# Patient Record
Sex: FEMALE | Race: BLACK OR AFRICAN AMERICAN | Employment: UNEMPLOYED | URBAN - METROPOLITAN AREA
[De-identification: names, ages, dates, MRNs, and addresses within clinical notes are randomized per-mention and may not be internally consistent; named-entity substitution may affect disease eponyms.]

---

## 2017-01-25 ENCOUNTER — OFFICE VISIT (OUTPATIENT)
Dept: PRIMARY CARE CLINIC | Age: 82
End: 2017-01-25

## 2017-01-25 VITALS
HEART RATE: 88 BPM | BODY MASS INDEX: 23.24 KG/M2 | SYSTOLIC BLOOD PRESSURE: 140 MMHG | WEIGHT: 148.4 LBS | DIASTOLIC BLOOD PRESSURE: 70 MMHG

## 2017-01-25 DIAGNOSIS — Z00.00 HEALTH CARE MAINTENANCE: ICD-10-CM

## 2017-01-25 DIAGNOSIS — E78.2 MIXED HYPERLIPIDEMIA: ICD-10-CM

## 2017-01-25 DIAGNOSIS — R73.03 PREDIABETES: ICD-10-CM

## 2017-01-25 DIAGNOSIS — Z78.0 POSTMENOPAUSAL: ICD-10-CM

## 2017-01-25 DIAGNOSIS — I10 ESSENTIAL HYPERTENSION: Primary | ICD-10-CM

## 2017-01-25 PROCEDURE — 99214 OFFICE O/P EST MOD 30 MIN: CPT | Performed by: INTERNAL MEDICINE

## 2017-01-27 ASSESSMENT — ENCOUNTER SYMPTOMS
NAUSEA: 0
WHEEZING: 0
DIARRHEA: 0
BACK PAIN: 0
COUGH: 0
CHEST TIGHTNESS: 0
VOMITING: 0
SHORTNESS OF BREATH: 0
EYE REDNESS: 0
EYE ITCHING: 0
ABDOMINAL PAIN: 0
CONSTIPATION: 0
SORE THROAT: 0

## 2017-03-16 ENCOUNTER — HOSPITAL ENCOUNTER (OUTPATIENT)
Dept: GENERAL RADIOLOGY | Age: 82
Discharge: HOME | End: 2017-03-16
Attending: INTERNAL MEDICINE | Admitting: INTERNAL MEDICINE

## 2017-03-16 DIAGNOSIS — Z78.0 POSTMENOPAUSAL: ICD-10-CM

## 2017-03-16 DIAGNOSIS — Z78.0 ASYMPTOMATIC MENOPAUSAL STATE: ICD-10-CM

## 2017-04-24 ENCOUNTER — OFFICE VISIT (OUTPATIENT)
Dept: PRIMARY CARE CLINIC | Age: 82
End: 2017-04-24

## 2017-04-24 VITALS
HEIGHT: 67 IN | DIASTOLIC BLOOD PRESSURE: 70 MMHG | BODY MASS INDEX: 23.07 KG/M2 | SYSTOLIC BLOOD PRESSURE: 148 MMHG | WEIGHT: 147 LBS

## 2017-04-24 DIAGNOSIS — I10 ESSENTIAL HYPERTENSION: ICD-10-CM

## 2017-04-24 DIAGNOSIS — N18.3 CKD (CHRONIC KIDNEY DISEASE), STAGE 3 (MODERATE): ICD-10-CM

## 2017-04-24 DIAGNOSIS — R73.03 PREDIABETES: ICD-10-CM

## 2017-04-24 DIAGNOSIS — R73.9 HYPERGLYCEMIA: ICD-10-CM

## 2017-04-24 DIAGNOSIS — E78.2 MIXED HYPERLIPIDEMIA: ICD-10-CM

## 2017-04-24 DIAGNOSIS — I10 ESSENTIAL HYPERTENSION: Primary | ICD-10-CM

## 2017-04-24 LAB
A/G RATIO: 1.7 (ref 1.1–2.2)
ALBUMIN SERPL-MCNC: 4.7 G/DL (ref 3.4–5)
ALP BLD-CCNC: 68 U/L (ref 40–129)
ALT SERPL-CCNC: 6 U/L (ref 10–40)
ANION GAP SERPL CALCULATED.3IONS-SCNC: 16 MMOL/L (ref 3–16)
AST SERPL-CCNC: 16 U/L (ref 15–37)
BILIRUB SERPL-MCNC: 0.3 MG/DL (ref 0–1)
BUN BLDV-MCNC: 14 MG/DL (ref 7–20)
CALCIUM SERPL-MCNC: 10.6 MG/DL (ref 8.3–10.6)
CHLORIDE BLD-SCNC: 107 MMOL/L (ref 99–110)
CHOLESTEROL, TOTAL: 272 MG/DL (ref 0–199)
CO2: 25 MMOL/L (ref 21–32)
CREAT SERPL-MCNC: 1.3 MG/DL (ref 0.6–1.2)
CREATININE URINE: 135.3 MG/DL (ref 28–259)
GFR AFRICAN AMERICAN: 47
GFR NON-AFRICAN AMERICAN: 39
GLOBULIN: 2.8 G/DL
GLUCOSE BLD-MCNC: 88 MG/DL (ref 70–99)
HDLC SERPL-MCNC: 91 MG/DL (ref 40–60)
LDL CHOLESTEROL CALCULATED: 151 MG/DL
MICROALBUMIN UR-MCNC: <1.2 MG/DL
MICROALBUMIN/CREAT UR-RTO: NORMAL MG/G (ref 0–30)
POTASSIUM SERPL-SCNC: 4.6 MMOL/L (ref 3.5–5.1)
SODIUM BLD-SCNC: 148 MMOL/L (ref 136–145)
TOTAL PROTEIN: 7.5 G/DL (ref 6.4–8.2)
TRIGL SERPL-MCNC: 149 MG/DL (ref 0–150)
VLDLC SERPL CALC-MCNC: 30 MG/DL

## 2017-04-24 PROCEDURE — 99214 OFFICE O/P EST MOD 30 MIN: CPT | Performed by: INTERNAL MEDICINE

## 2017-04-24 ASSESSMENT — ENCOUNTER SYMPTOMS
ABDOMINAL PAIN: 0
CHEST TIGHTNESS: 0
COUGH: 0
SHORTNESS OF BREATH: 0
VOMITING: 0
EYE REDNESS: 0
CONSTIPATION: 0
WHEEZING: 0
DIARRHEA: 0
SORE THROAT: 0
EYE ITCHING: 0
BACK PAIN: 0
NAUSEA: 0

## 2017-04-25 LAB
ESTIMATED AVERAGE GLUCOSE: 114 MG/DL
HBA1C MFR BLD: 5.6 %

## 2017-04-25 RX ORDER — ATORVASTATIN CALCIUM 10 MG/1
10 TABLET, FILM COATED ORAL DAILY
Qty: 90 TABLET | Refills: 1 | Status: SHIPPED | OUTPATIENT
Start: 2017-04-25 | End: 2017-07-24 | Stop reason: SDUPTHER

## 2017-05-12 DIAGNOSIS — I10 ESSENTIAL HYPERTENSION: ICD-10-CM

## 2017-05-12 RX ORDER — LISINOPRIL 40 MG/1
40 TABLET ORAL DAILY
Qty: 90 TABLET | Refills: 1 | Status: SHIPPED | OUTPATIENT
Start: 2017-05-12 | End: 2018-03-06 | Stop reason: SDUPTHER

## 2017-07-24 ENCOUNTER — OFFICE VISIT (OUTPATIENT)
Dept: PRIMARY CARE CLINIC | Age: 82
End: 2017-07-24

## 2017-07-24 VITALS — BODY MASS INDEX: 22.99 KG/M2 | WEIGHT: 146.8 LBS | DIASTOLIC BLOOD PRESSURE: 76 MMHG | SYSTOLIC BLOOD PRESSURE: 172 MMHG

## 2017-07-24 DIAGNOSIS — N18.3 CKD (CHRONIC KIDNEY DISEASE), STAGE 3 (MODERATE): ICD-10-CM

## 2017-07-24 DIAGNOSIS — E78.2 MIXED HYPERLIPIDEMIA: ICD-10-CM

## 2017-07-24 DIAGNOSIS — I10 ESSENTIAL HYPERTENSION: ICD-10-CM

## 2017-07-24 PROCEDURE — 99214 OFFICE O/P EST MOD 30 MIN: CPT | Performed by: INTERNAL MEDICINE

## 2017-07-24 RX ORDER — AMLODIPINE BESYLATE 10 MG/1
TABLET ORAL
Qty: 90 TABLET | Refills: 1 | Status: SHIPPED | OUTPATIENT
Start: 2017-07-24 | End: 2018-03-06 | Stop reason: SDUPTHER

## 2017-07-24 RX ORDER — HYDROCHLOROTHIAZIDE 25 MG/1
25 TABLET ORAL DAILY
Qty: 90 TABLET | Refills: 0 | Status: SHIPPED | OUTPATIENT
Start: 2017-07-24 | End: 2018-03-06 | Stop reason: SDUPTHER

## 2017-07-24 RX ORDER — ATORVASTATIN CALCIUM 10 MG/1
10 TABLET, FILM COATED ORAL DAILY
Qty: 90 TABLET | Refills: 1 | Status: SHIPPED | OUTPATIENT
Start: 2017-07-24 | End: 2018-03-06 | Stop reason: SDUPTHER

## 2017-07-24 ASSESSMENT — ENCOUNTER SYMPTOMS
NAUSEA: 0
EYE REDNESS: 0
CONSTIPATION: 0
EYE ITCHING: 0
CHEST TIGHTNESS: 0
BACK PAIN: 0
VOMITING: 0
ABDOMINAL PAIN: 0
SORE THROAT: 0
WHEEZING: 0
COUGH: 0
SHORTNESS OF BREATH: 0
DIARRHEA: 0

## 2017-07-24 ASSESSMENT — PATIENT HEALTH QUESTIONNAIRE - PHQ9
1. LITTLE INTEREST OR PLEASURE IN DOING THINGS: 0
2. FEELING DOWN, DEPRESSED OR HOPELESS: 1
SUM OF ALL RESPONSES TO PHQ9 QUESTIONS 1 & 2: 1
SUM OF ALL RESPONSES TO PHQ QUESTIONS 1-9: 1

## 2017-08-07 ENCOUNTER — OFFICE VISIT (OUTPATIENT)
Dept: PRIMARY CARE CLINIC | Age: 82
End: 2017-08-07

## 2017-08-07 VITALS — SYSTOLIC BLOOD PRESSURE: 144 MMHG | WEIGHT: 145.6 LBS | BODY MASS INDEX: 22.8 KG/M2 | DIASTOLIC BLOOD PRESSURE: 76 MMHG

## 2017-08-07 DIAGNOSIS — I10 ESSENTIAL HYPERTENSION: ICD-10-CM

## 2017-08-07 PROCEDURE — 99213 OFFICE O/P EST LOW 20 MIN: CPT | Performed by: INTERNAL MEDICINE

## 2017-08-08 ASSESSMENT — ENCOUNTER SYMPTOMS
BACK PAIN: 0
NAUSEA: 0
COUGH: 0
EYE ITCHING: 0
SORE THROAT: 0
DIARRHEA: 0
VOMITING: 0
EYE REDNESS: 0
SHORTNESS OF BREATH: 0
ABDOMINAL PAIN: 0
CONSTIPATION: 0
WHEEZING: 0
CHEST TIGHTNESS: 0

## 2017-12-06 ENCOUNTER — OFFICE VISIT (OUTPATIENT)
Dept: PRIMARY CARE CLINIC | Age: 82
End: 2017-12-06

## 2017-12-06 VITALS
BODY MASS INDEX: 22.3 KG/M2 | DIASTOLIC BLOOD PRESSURE: 78 MMHG | SYSTOLIC BLOOD PRESSURE: 146 MMHG | HEART RATE: 72 BPM | WEIGHT: 142.4 LBS

## 2017-12-06 DIAGNOSIS — N18.30 STAGE 3 CHRONIC KIDNEY DISEASE (HCC): ICD-10-CM

## 2017-12-06 DIAGNOSIS — R73.03 PREDIABETES: ICD-10-CM

## 2017-12-06 DIAGNOSIS — E78.2 MIXED HYPERLIPIDEMIA: ICD-10-CM

## 2017-12-06 DIAGNOSIS — I10 ESSENTIAL HYPERTENSION: ICD-10-CM

## 2017-12-06 PROCEDURE — 90686 IIV4 VACC NO PRSV 0.5 ML IM: CPT | Performed by: INTERNAL MEDICINE

## 2017-12-06 PROCEDURE — 99214 OFFICE O/P EST MOD 30 MIN: CPT | Performed by: INTERNAL MEDICINE

## 2017-12-06 PROCEDURE — G0008 ADMIN INFLUENZA VIRUS VAC: HCPCS | Performed by: INTERNAL MEDICINE

## 2017-12-06 ASSESSMENT — ENCOUNTER SYMPTOMS
EYE REDNESS: 0
NAUSEA: 0
COUGH: 0
SORE THROAT: 0
VOMITING: 0
WHEEZING: 0
EYE ITCHING: 0
DIARRHEA: 0
CHEST TIGHTNESS: 0
SHORTNESS OF BREATH: 0
CONSTIPATION: 0
ABDOMINAL PAIN: 0
BACK PAIN: 0

## 2017-12-06 NOTE — PROGRESS NOTES
MG tablet TAKE ONE TABLET BY MOUTH DAILY 90 tablet 1    lisinopril (PRINIVIL;ZESTRIL) 40 MG tablet Take 1 tablet by mouth daily 90 tablet 1    vitamin D (CHOLECALCIFEROL) 1000 UNIT TABS tablet Take 1,000 Units by mouth daily      latanoprost (XALATAN) 0.005 % ophthalmic solution Place 1 drop into the left eye nightly      timolol (BETIMOL) 0.25 % ophthalmic solution Place 1 drop into the left eye 2 times daily      brimonidine (ALPHAGAN) 0.2 % ophthalmic solution Place 1 drop into the left eye 3 times daily      atropine 1 % ophthalmic ointment Place into the right eye 3 times daily 3 times daily.  dorzolamide-timolol (COSOPT) 22.3-6.8 MG/ML ophthalmic solution Place 1 drop into the right eye 2 times daily.  acetaminophen (TYLENOL) 325 MG tablet Take 650 mg by mouth every 6 hours as needed. Review of Systems   Constitutional: Negative for activity change, appetite change, chills, diaphoresis, fever and unexpected weight change. HENT: Negative for congestion, ear pain and sore throat. Eyes: Negative for redness and itching. Respiratory: Negative for cough, chest tightness, shortness of breath and wheezing. Cardiovascular: Negative for chest pain, palpitations and leg swelling. Gastrointestinal: Negative for abdominal pain, constipation, diarrhea, nausea and vomiting. Genitourinary: Negative for difficulty urinating, dysuria and hematuria. Musculoskeletal: Positive for arthralgias. Negative for back pain and joint swelling. Skin: Negative for rash. Neurological: Negative for weakness and headaches. Psychiatric/Behavioral: Negative for dysphoric mood. Objective:   Physical Exam   Constitutional: She appears well-developed and well-nourished. No distress. HENT:   Head: Normocephalic and atraumatic. Eyes: Conjunctivae are normal. Right eye exhibits no discharge. Left eye exhibits no discharge. No scleral icterus.    Cardiovascular: Normal rate, regular rhythm and normal heart sounds. Exam reveals no gallop and no friction rub. No murmur heard. Pulmonary/Chest: Effort normal and breath sounds normal. No respiratory distress. She has no wheezes. She has no rales. Abdominal: Soft. Bowel sounds are normal. There is no tenderness. There is no guarding. Neurological: She is alert. Coordination normal.   Skin: Skin is warm and dry. She is not diaphoretic. Psychiatric: She has a normal mood and affect. Judgment normal.   Vitals reviewed. Assessment:      Hypertension  Stable and goal for patient.    -Continue current regimen. Hyperlipidemia  Stable. No problem with med.    -Continue current regimen. Prediabetes  Not activated. Stressors with recent passing of sister.    -Coaching given. CKD (chronic kidney disease)  Stable. -Continue current regimen.           Plan:      Above

## 2017-12-06 NOTE — PROGRESS NOTES
Vaccine Information Sheet, \"Influenza - Inactivated\"  given to Claudell Jenny, or parent/legal guardian of  Claudell Jenny and verbalized understanding. Patient responses:    Have you ever had a reaction to a flu vaccine? No  Are you able to eat eggs without adverse effects? Yes  Do you have any current illness? No  Have you ever had Guillian Eagle Syndrome? No    Flu vaccine given per order. Please see immunization tab.

## 2018-03-06 ENCOUNTER — OFFICE VISIT (OUTPATIENT)
Dept: PRIMARY CARE CLINIC | Age: 83
End: 2018-03-06

## 2018-03-06 VITALS
BODY MASS INDEX: 22.4 KG/M2 | HEART RATE: 88 BPM | WEIGHT: 143 LBS | DIASTOLIC BLOOD PRESSURE: 78 MMHG | SYSTOLIC BLOOD PRESSURE: 168 MMHG

## 2018-03-06 DIAGNOSIS — I10 ESSENTIAL HYPERTENSION: ICD-10-CM

## 2018-03-06 LAB
A/G RATIO: 1.5 (ref 1.1–2.2)
ALBUMIN SERPL-MCNC: 4.6 G/DL (ref 3.4–5)
ALP BLD-CCNC: 66 U/L (ref 40–129)
ALT SERPL-CCNC: 6 U/L (ref 10–40)
ANION GAP SERPL CALCULATED.3IONS-SCNC: 14 MMOL/L (ref 3–16)
AST SERPL-CCNC: 18 U/L (ref 15–37)
BILIRUB SERPL-MCNC: 0.4 MG/DL (ref 0–1)
BUN BLDV-MCNC: 15 MG/DL (ref 7–20)
CALCIUM SERPL-MCNC: 10.6 MG/DL (ref 8.3–10.6)
CHLORIDE BLD-SCNC: 102 MMOL/L (ref 99–110)
CHOLESTEROL, TOTAL: 198 MG/DL (ref 0–199)
CO2: 27 MMOL/L (ref 21–32)
CREAT SERPL-MCNC: 1.2 MG/DL (ref 0.6–1.2)
GFR AFRICAN AMERICAN: 52
GFR NON-AFRICAN AMERICAN: 43
GLOBULIN: 3 G/DL
GLUCOSE BLD-MCNC: 95 MG/DL (ref 70–99)
HDLC SERPL-MCNC: 98 MG/DL (ref 40–60)
LDL CHOLESTEROL CALCULATED: 71 MG/DL
POTASSIUM SERPL-SCNC: 4.2 MMOL/L (ref 3.5–5.1)
SODIUM BLD-SCNC: 143 MMOL/L (ref 136–145)
TOTAL PROTEIN: 7.6 G/DL (ref 6.4–8.2)
TRIGL SERPL-MCNC: 147 MG/DL (ref 0–150)
VLDLC SERPL CALC-MCNC: 29 MG/DL

## 2018-03-06 PROCEDURE — 99214 OFFICE O/P EST MOD 30 MIN: CPT | Performed by: INTERNAL MEDICINE

## 2018-03-06 RX ORDER — ATORVASTATIN CALCIUM 10 MG/1
10 TABLET, FILM COATED ORAL DAILY
Qty: 90 TABLET | Refills: 1 | Status: SHIPPED | OUTPATIENT
Start: 2018-03-06 | End: 2018-06-25 | Stop reason: SDUPTHER

## 2018-03-06 RX ORDER — AMLODIPINE BESYLATE 10 MG/1
TABLET ORAL
Qty: 90 TABLET | Refills: 1 | Status: SHIPPED | OUTPATIENT
Start: 2018-03-06 | End: 2018-06-25 | Stop reason: SDUPTHER

## 2018-03-06 RX ORDER — LISINOPRIL 40 MG/1
40 TABLET ORAL DAILY
Qty: 90 TABLET | Refills: 1 | Status: SHIPPED | OUTPATIENT
Start: 2018-03-06 | End: 2018-06-25 | Stop reason: SDUPTHER

## 2018-03-06 RX ORDER — HYDROCHLOROTHIAZIDE 25 MG/1
25 TABLET ORAL DAILY
Qty: 90 TABLET | Refills: 0 | Status: SHIPPED | OUTPATIENT
Start: 2018-03-06 | End: 2018-06-25 | Stop reason: SDUPTHER
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
## 2018-03-20 ENCOUNTER — OFFICE VISIT (OUTPATIENT)
Dept: PRIMARY CARE CLINIC | Age: 83
End: 2018-03-20

## 2018-03-20 DIAGNOSIS — I10 ESSENTIAL HYPERTENSION: ICD-10-CM

## 2018-03-20 PROCEDURE — 99213 OFFICE O/P EST LOW 20 MIN: CPT | Performed by: INTERNAL MEDICINE

## 2018-03-21 VITALS
SYSTOLIC BLOOD PRESSURE: 136 MMHG | BODY MASS INDEX: 22.71 KG/M2 | DIASTOLIC BLOOD PRESSURE: 72 MMHG | HEART RATE: 80 BPM | WEIGHT: 145 LBS

## 2018-03-21 ASSESSMENT — ENCOUNTER SYMPTOMS
CONSTIPATION: 0
EYE REDNESS: 0
CHEST TIGHTNESS: 0
NAUSEA: 0
SHORTNESS OF BREATH: 0
ABDOMINAL PAIN: 0
WHEEZING: 0
SORE THROAT: 0
COUGH: 0
EYE ITCHING: 0
VOMITING: 0
BACK PAIN: 0
DIARRHEA: 0

## 2018-06-25 ENCOUNTER — OFFICE VISIT (OUTPATIENT)
Dept: PRIMARY CARE CLINIC | Age: 83
End: 2018-06-25

## 2018-06-25 VITALS
BODY MASS INDEX: 24.75 KG/M2 | DIASTOLIC BLOOD PRESSURE: 66 MMHG | WEIGHT: 145 LBS | HEIGHT: 64 IN | SYSTOLIC BLOOD PRESSURE: 134 MMHG

## 2018-06-25 DIAGNOSIS — R73.03 PREDIABETES: ICD-10-CM

## 2018-06-25 DIAGNOSIS — N18.30 STAGE 3 CHRONIC KIDNEY DISEASE (HCC): ICD-10-CM

## 2018-06-25 DIAGNOSIS — E78.2 MIXED HYPERLIPIDEMIA: ICD-10-CM

## 2018-06-25 DIAGNOSIS — I10 ESSENTIAL HYPERTENSION: ICD-10-CM

## 2018-06-25 PROCEDURE — 99214 OFFICE O/P EST MOD 30 MIN: CPT | Performed by: INTERNAL MEDICINE

## 2018-06-25 RX ORDER — LISINOPRIL 40 MG/1
40 TABLET ORAL DAILY
Qty: 90 TABLET | Refills: 1 | Status: SHIPPED | OUTPATIENT
Start: 2018-06-25 | End: 2020-11-25

## 2018-06-25 RX ORDER — AMLODIPINE BESYLATE 10 MG/1
TABLET ORAL
Qty: 90 TABLET | Refills: 1 | Status: SHIPPED | OUTPATIENT
Start: 2018-06-25 | End: 2020-11-25

## 2018-06-25 RX ORDER — HYDROCHLOROTHIAZIDE 25 MG/1
25 TABLET ORAL DAILY
Qty: 90 TABLET | Refills: 1 | Status: SHIPPED | OUTPATIENT
Start: 2018-06-25 | End: 2020-11-25

## 2018-06-25 RX ORDER — ATORVASTATIN CALCIUM 10 MG/1
10 TABLET, FILM COATED ORAL DAILY
Qty: 90 TABLET | Refills: 1 | Status: SHIPPED | OUTPATIENT
Start: 2018-06-25 | End: 2020-11-25

## 2018-06-25 ASSESSMENT — ENCOUNTER SYMPTOMS
COUGH: 0
CONSTIPATION: 0
EYE ITCHING: 0
EYE REDNESS: 0
BACK PAIN: 0
WHEEZING: 0
NAUSEA: 0
SORE THROAT: 0
VOMITING: 0
SHORTNESS OF BREATH: 0
CHEST TIGHTNESS: 0
ABDOMINAL PAIN: 0
DIARRHEA: 0

## 2018-09-25 ENCOUNTER — OFFICE VISIT (OUTPATIENT)
Dept: PRIMARY CARE CLINIC | Age: 83
End: 2018-09-25
Payer: MEDICARE

## 2018-09-25 VITALS — BODY MASS INDEX: 25.75 KG/M2 | DIASTOLIC BLOOD PRESSURE: 72 MMHG | SYSTOLIC BLOOD PRESSURE: 134 MMHG | WEIGHT: 150 LBS

## 2018-09-25 DIAGNOSIS — R73.03 PREDIABETES: ICD-10-CM

## 2018-09-25 DIAGNOSIS — I10 ESSENTIAL HYPERTENSION: ICD-10-CM

## 2018-09-25 DIAGNOSIS — N18.30 STAGE 3 CHRONIC KIDNEY DISEASE (HCC): ICD-10-CM

## 2018-09-25 DIAGNOSIS — E78.2 MIXED HYPERLIPIDEMIA: ICD-10-CM

## 2018-09-25 PROCEDURE — G0008 ADMIN INFLUENZA VIRUS VAC: HCPCS | Performed by: INTERNAL MEDICINE

## 2018-09-25 PROCEDURE — 90662 IIV NO PRSV INCREASED AG IM: CPT | Performed by: INTERNAL MEDICINE

## 2018-09-25 PROCEDURE — 90750 HZV VACC RECOMBINANT IM: CPT | Performed by: INTERNAL MEDICINE

## 2018-09-25 PROCEDURE — 99214 OFFICE O/P EST MOD 30 MIN: CPT | Performed by: INTERNAL MEDICINE

## 2018-09-25 PROCEDURE — 90471 IMMUNIZATION ADMIN: CPT | Performed by: INTERNAL MEDICINE

## 2018-09-25 ASSESSMENT — PATIENT HEALTH QUESTIONNAIRE - PHQ9
SUM OF ALL RESPONSES TO PHQ QUESTIONS 1-9: 0
1. LITTLE INTEREST OR PLEASURE IN DOING THINGS: 0
SUM OF ALL RESPONSES TO PHQ9 QUESTIONS 1 & 2: 0
SUM OF ALL RESPONSES TO PHQ QUESTIONS 1-9: 0
2. FEELING DOWN, DEPRESSED OR HOPELESS: 0

## 2018-09-26 NOTE — PROGRESS NOTES
Subjective:      Patient ID: Eddie Martines is a 80 y.o. female. HPI  Patient Active Problem List   Diagnosis    History of malignant melanoma of eye    Glaucoma    Health care maintenance    Hypertension  HYPERTENSION. Also here for f/u HTN. She indicates that she is feeling well and denies any symptoms referable to her elevated blood pressure. Specifically denies chest pain, palpitations, dyspnea, orthopnea, PND or peripheral edema. No anorexia, arthralgia, or leg cramps noted. Current medication regimen is as listed below. She denies any side effects of medication, and has been taking it regularly. Patient does not exercise regularly      CKD (chronic kidney disease)    Hyperlipidemia  Dyslipidemia: Patient presents for f/u of lipid disorder. Compliance with treatment thus far has been good. A repeat fasting lipid profile was not done. The patient does not have family history of premature CAD. The patient denies side effects from medication including nausea and myalgias. The patient exercises rarely. Patient is overweight.     Prediabetes       Vitals:    09/25/18 1258   BP: 134/72   Weight: 150 lb (68 kg)      Wt Readings from Last 2 Encounters:   09/25/18 150 lb (68 kg)   06/25/18 145 lb (65.8 kg)     BP Readings from Last 3 Encounters:   09/25/18 134/72   06/25/18 134/66   03/20/18 136/72        No Known Allergies  Outpatient Prescriptions Marked as Taking for the 9/25/18 encounter (Office Visit) with Shayy Luna MD   Medication Sig Dispense Refill    amLODIPine (NORVASC) 10 MG tablet TAKE ONE TABLET BY MOUTH DAILY 90 tablet 1    lisinopril (PRINIVIL;ZESTRIL) 40 MG tablet Take 1 tablet by mouth daily 90 tablet 1    hydrochlorothiazide (HYDRODIURIL) 25 MG tablet Take 1 tablet by mouth daily 90 tablet 1    atorvastatin (LIPITOR) 10 MG tablet Take 1 tablet by mouth daily 90 tablet 1    vitamin D (CHOLECALCIFEROL) 1000 UNIT TABS tablet Take 1,000 Units by mouth daily      latanoprost (XALATAN) 0.005 % ophthalmic solution Place 1 drop into the left eye nightly      timolol (BETIMOL) 0.25 % ophthalmic solution Place 1 drop into the left eye 2 times daily      brimonidine (ALPHAGAN) 0.2 % ophthalmic solution Place 1 drop into the left eye 3 times daily      atropine 1 % ophthalmic ointment Place into the right eye 3 times daily 3 times daily.  dorzolamide-timolol (COSOPT) 22.3-6.8 MG/ML ophthalmic solution Place 1 drop into the right eye 2 times daily.  acetaminophen (TYLENOL) 325 MG tablet Take 650 mg by mouth every 6 hours as needed. Review of Systems    Objective:   Physical Exam   Constitutional: She appears well-developed and well-nourished. No distress. HENT:   Head: Normocephalic and atraumatic. Eyes: Conjunctivae are normal. Right eye exhibits no discharge. Left eye exhibits no discharge. No scleral icterus. Cardiovascular: Normal rate, regular rhythm and normal heart sounds. Exam reveals no gallop and no friction rub. No murmur heard. Pulmonary/Chest: Effort normal and breath sounds normal. No respiratory distress. She has no wheezes. She has no rales. Abdominal: Soft. Bowel sounds are normal. There is no tenderness. There is no guarding. Neurological: She is alert. Coordination normal.   Skin: Skin is warm and dry. She is not diaphoretic. Psychiatric: She has a normal mood and affect. Judgment normal.   Vitals reviewed. Assessment:      Hypertension  Stable. -Continue current regimen. Hyperlipidemia  Stable. No problem with med.    -Continue current regimen. Prediabetes  Stable. -Continue to monitor periodically. CKD (chronic kidney disease)  Stable. -Continue to monitor periodically.           Plan:      Above         Vladimir Espinosa MD

## 2020-11-15 LAB — SARS-COV-2: DETECTED

## 2020-11-25 ENCOUNTER — APPOINTMENT (OUTPATIENT)
Dept: GENERAL RADIOLOGY | Age: 85
DRG: 177 | End: 2020-11-25
Payer: MEDICARE

## 2020-11-25 ENCOUNTER — HOSPITAL ENCOUNTER (INPATIENT)
Age: 85
LOS: 3 days | Discharge: HOME | DRG: 177 | End: 2020-11-28
Attending: EMERGENCY MEDICINE | Admitting: INTERNAL MEDICINE
Payer: MEDICARE

## 2020-11-25 ENCOUNTER — APPOINTMENT (OUTPATIENT)
Dept: CT IMAGING | Age: 85
DRG: 177 | End: 2020-11-25
Payer: MEDICARE

## 2020-11-25 PROBLEM — R09.02 HYPOXIA: Status: ACTIVE | Noted: 2020-11-25

## 2020-11-25 LAB
ALBUMIN SERPL-MCNC: 2.7 G/DL (ref 3.4–5)
ALP BLD-CCNC: 91 U/L (ref 40–129)
ALT SERPL-CCNC: 26 U/L (ref 10–40)
ANION GAP SERPL CALCULATED.3IONS-SCNC: 10 MMOL/L (ref 3–16)
AST SERPL-CCNC: 28 U/L (ref 15–37)
BASE EXCESS VENOUS: 6 MMOL/L
BASOPHILS ABSOLUTE: 0 K/UL (ref 0–0.2)
BASOPHILS RELATIVE PERCENT: 0.3 %
BILIRUB SERPL-MCNC: 0.3 MG/DL (ref 0–1)
BILIRUBIN DIRECT: <0.2 MG/DL (ref 0–0.3)
BILIRUBIN URINE: NEGATIVE
BILIRUBIN, INDIRECT: ABNORMAL MG/DL (ref 0–1)
BLOOD, URINE: NEGATIVE
BUN BLDV-MCNC: 8 MG/DL (ref 7–20)
C-REACTIVE PROTEIN: 85.2 MG/L (ref 0–5.1)
CALCIUM SERPL-MCNC: 9.2 MG/DL (ref 8.3–10.6)
CARBOXYHEMOGLOBIN: 1.4 %
CHLORIDE BLD-SCNC: 105 MMOL/L (ref 99–110)
CLARITY: CLEAR
CO2: 27 MMOL/L (ref 21–32)
COLOR: YELLOW
CREAT SERPL-MCNC: 0.8 MG/DL (ref 0.6–1.2)
EKG ATRIAL RATE: 116 BPM
EKG DIAGNOSIS: NORMAL
EKG P AXIS: 63 DEGREES
EKG P-R INTERVAL: 134 MS
EKG Q-T INTERVAL: 322 MS
EKG QRS DURATION: 82 MS
EKG QTC CALCULATION (BAZETT): 447 MS
EKG R AXIS: -6 DEGREES
EKG T AXIS: 7 DEGREES
EKG VENTRICULAR RATE: 116 BPM
EOSINOPHILS ABSOLUTE: 0 K/UL (ref 0–0.6)
EOSINOPHILS RELATIVE PERCENT: 0.8 %
EPITHELIAL CELLS, UA: 4 /HPF (ref 0–5)
GFR AFRICAN AMERICAN: >60
GFR NON-AFRICAN AMERICAN: >60
GLUCOSE BLD-MCNC: 125 MG/DL (ref 70–99)
GLUCOSE BLD-MCNC: 89 MG/DL (ref 70–99)
GLUCOSE URINE: NEGATIVE MG/DL
HCO3 VENOUS: 30 MMOL/L (ref 23–29)
HCT VFR BLD CALC: 35.7 % (ref 36–48)
HEMOGLOBIN: 11.8 G/DL (ref 12–16)
HYALINE CASTS: 5 /LPF (ref 0–8)
INR BLD: 1.09 (ref 0.86–1.14)
KETONES, URINE: NEGATIVE MG/DL
LACTIC ACID: 1.4 MMOL/L (ref 0.4–2)
LEUKOCYTE ESTERASE, URINE: NEGATIVE
LIPASE: 23 U/L (ref 13–60)
LYMPHOCYTES ABSOLUTE: 0.6 K/UL (ref 1–5.1)
LYMPHOCYTES RELATIVE PERCENT: 12.4 %
MCH RBC QN AUTO: 28.9 PG (ref 26–34)
MCHC RBC AUTO-ENTMCNC: 33.1 G/DL (ref 31–36)
MCV RBC AUTO: 87.3 FL (ref 80–100)
METHEMOGLOBIN VENOUS: 0.2 %
MICROSCOPIC EXAMINATION: YES
MONOCYTES ABSOLUTE: 0.3 K/UL (ref 0–1.3)
MONOCYTES RELATIVE PERCENT: 6.5 %
NEUTROPHILS ABSOLUTE: 4.1 K/UL (ref 1.7–7.7)
NEUTROPHILS RELATIVE PERCENT: 80 %
NITRITE, URINE: NEGATIVE
O2 CONTENT, VEN: 8 ML/DL
O2 SAT, VEN: 62 %
O2 THERAPY: ABNORMAL
PCO2, VEN: 39.8 MMHG (ref 40–50)
PDW BLD-RTO: 18 % (ref 12.4–15.4)
PERFORMED ON: NORMAL
PH UA: 7 (ref 5–8)
PH VENOUS: 7.49 (ref 7.35–7.45)
PLATELET # BLD: 231 K/UL (ref 135–450)
PMV BLD AUTO: 8.1 FL (ref 5–10.5)
PO2, VEN: 31 MMHG
POTASSIUM REFLEX MAGNESIUM: 3.7 MMOL/L (ref 3.5–5.1)
PRO-BNP: 900 PG/ML (ref 0–449)
PROTEIN UA: 30 MG/DL
PROTHROMBIN TIME: 12.6 SEC (ref 10–13.2)
RBC # BLD: 4.1 M/UL (ref 4–5.2)
RBC UA: 1 /HPF (ref 0–4)
SEDIMENTATION RATE, ERYTHROCYTE: 91 MM/HR (ref 0–30)
SODIUM BLD-SCNC: 142 MMOL/L (ref 136–145)
SPECIFIC GRAVITY UA: 1.01 (ref 1–1.03)
TCO2 CALC VENOUS: 31 MMOL/L
TOTAL PROTEIN: 5.6 G/DL (ref 6.4–8.2)
TROPONIN: 0.02 NG/ML
TROPONIN: 0.02 NG/ML
TROPONIN: 0.03 NG/ML
URINE REFLEX TO CULTURE: ABNORMAL
URINE TYPE: ABNORMAL
UROBILINOGEN, URINE: 0.2 E.U./DL
WBC # BLD: 5.1 K/UL (ref 4–11)
WBC UA: 2 /HPF (ref 0–5)

## 2020-11-25 PROCEDURE — 85025 COMPLETE CBC W/AUTO DIFF WBC: CPT

## 2020-11-25 PROCEDURE — 80048 BASIC METABOLIC PNL TOTAL CA: CPT

## 2020-11-25 PROCEDURE — 83880 ASSAY OF NATRIURETIC PEPTIDE: CPT

## 2020-11-25 PROCEDURE — 6360000002 HC RX W HCPCS: Performed by: EMERGENCY MEDICINE

## 2020-11-25 PROCEDURE — 71250 CT THORAX DX C-: CPT

## 2020-11-25 PROCEDURE — 6370000000 HC RX 637 (ALT 250 FOR IP): Performed by: INTERNAL MEDICINE

## 2020-11-25 PROCEDURE — 93010 ELECTROCARDIOGRAM REPORT: CPT | Performed by: INTERNAL MEDICINE

## 2020-11-25 PROCEDURE — 96361 HYDRATE IV INFUSION ADD-ON: CPT

## 2020-11-25 PROCEDURE — 83690 ASSAY OF LIPASE: CPT

## 2020-11-25 PROCEDURE — 6370000000 HC RX 637 (ALT 250 FOR IP): Performed by: EMERGENCY MEDICINE

## 2020-11-25 PROCEDURE — 83605 ASSAY OF LACTIC ACID: CPT

## 2020-11-25 PROCEDURE — 82803 BLOOD GASES ANY COMBINATION: CPT

## 2020-11-25 PROCEDURE — 93005 ELECTROCARDIOGRAM TRACING: CPT | Performed by: EMERGENCY MEDICINE

## 2020-11-25 PROCEDURE — 84484 ASSAY OF TROPONIN QUANT: CPT

## 2020-11-25 PROCEDURE — 85610 PROTHROMBIN TIME: CPT

## 2020-11-25 PROCEDURE — 73630 X-RAY EXAM OF FOOT: CPT

## 2020-11-25 PROCEDURE — 81001 URINALYSIS AUTO W/SCOPE: CPT

## 2020-11-25 PROCEDURE — 2700000000 HC OXYGEN THERAPY PER DAY

## 2020-11-25 PROCEDURE — 2580000003 HC RX 258: Performed by: EMERGENCY MEDICINE

## 2020-11-25 PROCEDURE — 6360000002 HC RX W HCPCS: Performed by: INTERNAL MEDICINE

## 2020-11-25 PROCEDURE — 96365 THER/PROPH/DIAG IV INF INIT: CPT

## 2020-11-25 PROCEDURE — 99285 EMERGENCY DEPT VISIT HI MDM: CPT

## 2020-11-25 PROCEDURE — 94761 N-INVAS EAR/PLS OXIMETRY MLT: CPT

## 2020-11-25 PROCEDURE — 2060000000 HC ICU INTERMEDIATE R&B

## 2020-11-25 PROCEDURE — 36415 COLL VENOUS BLD VENIPUNCTURE: CPT

## 2020-11-25 PROCEDURE — 86140 C-REACTIVE PROTEIN: CPT

## 2020-11-25 PROCEDURE — 80076 HEPATIC FUNCTION PANEL: CPT

## 2020-11-25 PROCEDURE — 71045 X-RAY EXAM CHEST 1 VIEW: CPT

## 2020-11-25 PROCEDURE — 94640 AIRWAY INHALATION TREATMENT: CPT

## 2020-11-25 PROCEDURE — 85652 RBC SED RATE AUTOMATED: CPT

## 2020-11-25 RX ORDER — GABAPENTIN 100 MG/1
100 CAPSULE ORAL 2 TIMES DAILY
Status: DISCONTINUED | OUTPATIENT
Start: 2020-11-25 | End: 2020-11-28 | Stop reason: HOSPADM

## 2020-11-25 RX ORDER — ATORVASTATIN CALCIUM 40 MG/1
40 TABLET, FILM COATED ORAL DAILY
COMMUNITY
Start: 2020-11-10

## 2020-11-25 RX ORDER — COLCHICINE 0.6 MG/1
0.6 TABLET ORAL DAILY
COMMUNITY
Start: 2020-11-23

## 2020-11-25 RX ORDER — CLOPIDOGREL BISULFATE 75 MG/1
75 TABLET ORAL DAILY
Status: DISCONTINUED | OUTPATIENT
Start: 2020-11-25 | End: 2020-11-28 | Stop reason: HOSPADM

## 2020-11-25 RX ORDER — ASPIRIN 81 MG/1
81 TABLET, COATED ORAL DAILY
COMMUNITY
Start: 2020-11-05

## 2020-11-25 RX ORDER — COLCHICINE 0.6 MG/1
0.6 TABLET ORAL DAILY
Status: DISCONTINUED | OUTPATIENT
Start: 2020-11-25 | End: 2020-11-28 | Stop reason: HOSPADM

## 2020-11-25 RX ORDER — ALBUTEROL SULFATE 90 UG/1
2 AEROSOL, METERED RESPIRATORY (INHALATION) 4 TIMES DAILY
Status: DISCONTINUED | OUTPATIENT
Start: 2020-11-25 | End: 2020-11-25

## 2020-11-25 RX ORDER — LATANOPROST 50 UG/ML
1 SOLUTION/ DROPS OPHTHALMIC NIGHTLY
Status: DISCONTINUED | OUTPATIENT
Start: 2020-11-25 | End: 2020-11-28 | Stop reason: HOSPADM

## 2020-11-25 RX ORDER — FUROSEMIDE 10 MG/ML
20 INJECTION INTRAMUSCULAR; INTRAVENOUS ONCE
Status: COMPLETED | OUTPATIENT
Start: 2020-11-25 | End: 2020-11-25

## 2020-11-25 RX ORDER — ACETAMINOPHEN 325 MG/1
650 TABLET ORAL ONCE
Status: COMPLETED | OUTPATIENT
Start: 2020-11-25 | End: 2020-11-25

## 2020-11-25 RX ORDER — GABAPENTIN 100 MG/1
100 CAPSULE ORAL 2 TIMES DAILY
COMMUNITY
Start: 2020-11-05

## 2020-11-25 RX ORDER — PREDNISONE 10 MG/1
10 TABLET ORAL DAILY
COMMUNITY

## 2020-11-25 RX ORDER — ASPIRIN 81 MG/1
81 TABLET ORAL DAILY
Status: DISCONTINUED | OUTPATIENT
Start: 2020-11-25 | End: 2020-11-28 | Stop reason: HOSPADM

## 2020-11-25 RX ORDER — BRIMONIDINE TARTRATE 2 MG/ML
1 SOLUTION/ DROPS OPHTHALMIC 3 TIMES DAILY
Status: DISCONTINUED | OUTPATIENT
Start: 2020-11-25 | End: 2020-11-28 | Stop reason: HOSPADM

## 2020-11-25 RX ORDER — ATORVASTATIN CALCIUM 40 MG/1
40 TABLET, FILM COATED ORAL DAILY
Status: DISCONTINUED | OUTPATIENT
Start: 2020-11-25 | End: 2020-11-28 | Stop reason: HOSPADM

## 2020-11-25 RX ORDER — VITAMIN B COMPLEX
1000 TABLET ORAL DAILY
Status: DISCONTINUED | OUTPATIENT
Start: 2020-11-25 | End: 2020-11-28 | Stop reason: HOSPADM

## 2020-11-25 RX ORDER — 0.9 % SODIUM CHLORIDE 0.9 %
1000 INTRAVENOUS SOLUTION INTRAVENOUS ONCE
Status: COMPLETED | OUTPATIENT
Start: 2020-11-25 | End: 2020-11-25

## 2020-11-25 RX ORDER — PREDNISONE 1 MG/1
10 TABLET ORAL DAILY
Status: DISCONTINUED | OUTPATIENT
Start: 2020-11-25 | End: 2020-11-26

## 2020-11-25 RX ORDER — CLOPIDOGREL BISULFATE 75 MG/1
75 TABLET ORAL DAILY
COMMUNITY
Start: 2020-11-05

## 2020-11-25 RX ADMIN — LIDOCAINE HYDROCHLORIDE: 20 SOLUTION ORAL; TOPICAL at 11:42

## 2020-11-25 RX ADMIN — GABAPENTIN 100 MG: 100 CAPSULE ORAL at 23:11

## 2020-11-25 RX ADMIN — MEROPENEM 500 MG: 500 INJECTION, POWDER, FOR SOLUTION INTRAVENOUS at 16:51

## 2020-11-25 RX ADMIN — IPRATROPIUM BROMIDE AND ALBUTEROL 1 PUFF: 20; 100 SPRAY, METERED RESPIRATORY (INHALATION) at 21:25

## 2020-11-25 RX ADMIN — Medication 1000 UNITS: at 23:12

## 2020-11-25 RX ADMIN — FUROSEMIDE 20 MG: 10 INJECTION, SOLUTION INTRAMUSCULAR; INTRAVENOUS at 23:13

## 2020-11-25 RX ADMIN — COLCHICINE 0.6 MG: 0.6 TABLET, FILM COATED ORAL at 23:12

## 2020-11-25 RX ADMIN — PREDNISONE 10 MG: 5 TABLET ORAL at 23:12

## 2020-11-25 RX ADMIN — ATORVASTATIN CALCIUM 40 MG: 40 TABLET, FILM COATED ORAL at 23:12

## 2020-11-25 RX ADMIN — SODIUM CHLORIDE 1000 ML: 9 INJECTION, SOLUTION INTRAVENOUS at 11:41

## 2020-11-25 RX ADMIN — LATANOPROST 1 DROP: 50 SOLUTION OPHTHALMIC at 23:13

## 2020-11-25 RX ADMIN — BRIMONIDINE TARTRATE 1 DROP: 2 SOLUTION OPHTHALMIC at 23:13

## 2020-11-25 RX ADMIN — ACETAMINOPHEN 650 MG: 325 TABLET ORAL at 11:41

## 2020-11-25 ASSESSMENT — PAIN SCALES - GENERAL
PAINLEVEL_OUTOF10: 0
PAINLEVEL_OUTOF10: 2
PAINLEVEL_OUTOF10: 0

## 2020-11-25 ASSESSMENT — PAIN DESCRIPTION - FREQUENCY: FREQUENCY: CONTINUOUS

## 2020-11-25 ASSESSMENT — ENCOUNTER SYMPTOMS
WHEEZING: 0
VOMITING: 0
ABDOMINAL PAIN: 0
SORE THROAT: 0
SHORTNESS OF BREATH: 1

## 2020-11-25 ASSESSMENT — PAIN DESCRIPTION - DESCRIPTORS: DESCRIPTORS: OTHER (COMMENT)

## 2020-11-25 ASSESSMENT — PAIN DESCRIPTION - LOCATION: LOCATION: CHEST

## 2020-11-25 ASSESSMENT — PAIN DESCRIPTION - PAIN TYPE: TYPE: ACUTE PAIN

## 2020-11-25 NOTE — LETTER
[facility] Emergency Department  [address] [facility] [address]  Phone: [phone]               November 25, 2020      To Whom It May Concern:    Alverto Florian was at our emergency department on 11/25/2020 with his family member.      Sincerely,       Melony Enrique RN         Signature:__________________________________

## 2020-11-25 NOTE — LETTER
[facility] Emergency Department  [address] [address]  Phone: [phone]               November 25, 2020      To Whom It May Concern:    Brooke Ugarte was at our emergency department on 11/25/2020 with a family member.     Sincerely,       Radha Parsons RN         Signature:__________________________________

## 2020-11-25 NOTE — ED NOTES
Wound care provided to bottom of left foot and left second toe amputation. Areas cleaned, non-adherent dressing applied, covered with dry sterile dressing and wrapped with Kerlix. No complications noted. Pt tolerated well.        Pao Banda RN  11/25/20 5255

## 2020-11-25 NOTE — ED PROVIDER NOTES
PAST MEDICAL HISTORY     Past Medical History:   Diagnosis Date    Chronic kidney disease     Gout     Hyperlipidemia     Hypertension        SURGICALHISTORY       Past Surgical History:   Procedure Laterality Date    APPENDECTOMY      EYE SURGERY  2004    XRT plaque OD for melanoma    FOOT SURGERY      left     CURRENT MEDICATIONS       Previous Medications    ACETAMINOPHEN (TYLENOL) 325 MG TABLET    Take 650 mg by mouth every 6 hours as needed. AMLODIPINE (NORVASC) 10 MG TABLET    TAKE ONE TABLET BY MOUTH DAILY    ASPIRIN LOW DOSE 81 MG EC TABLET    Take 81 mg by mouth daily    ATORVASTATIN (LIPITOR) 40 MG TABLET    Take 40 mg by mouth daily    ATROPINE 1 % OPHTHALMIC OINTMENT    Place into the right eye 3 times daily 3 times daily. BRIMONIDINE (ALPHAGAN) 0.2 % OPHTHALMIC SOLUTION    Place 1 drop into the left eye 3 times daily    CLOPIDOGREL (PLAVIX) 75 MG TABLET    Take 75 mg by mouth daily    COLCHICINE (COLCRYS) 0.6 MG TABLET    Take 0.6 mg by mouth daily    DORZOLAMIDE-TIMOLOL (COSOPT) 22.3-6.8 MG/ML OPHTHALMIC SOLUTION    Place 1 drop into the right eye 2 times daily. GABAPENTIN (NEURONTIN) 100 MG CAPSULE    Take 100 mg by mouth 2 times daily. HYDROCHLOROTHIAZIDE (HYDRODIURIL) 25 MG TABLET    Take 1 tablet by mouth daily    LATANOPROST (XALATAN) 0.005 % OPHTHALMIC SOLUTION    Place 1 drop into the left eye nightly    LISINOPRIL (PRINIVIL;ZESTRIL) 40 MG TABLET    Take 1 tablet by mouth daily    SODIUM CHLORIDE 0.9 % SOLN 50 ML WITH ERTAPENEM 1 GM SOLR 1 G    Infuse 1 g intravenously every 24 hours X 10 days (started 11/23)    TIMOLOL (BETIMOL) 0.25 % OPHTHALMIC SOLUTION    Place 1 drop into the left eye 2 times daily    VITAMIN D (CHOLECALCIFEROL) 1000 UNIT TABS TABLET    Take 1,000 Units by mouth daily      ALLERGIES     Patient has no known allergies.   FAMILY HISTORY       Family History   Problem Relation Age of Onset    Cancer Father         colon     SOCIAL HISTORY       Social History     Socioeconomic History    Marital status:      Spouse name: None    Number of children: None    Years of education: None    Highest education level: None   Occupational History    None   Social Needs    Financial resource strain: None    Food insecurity     Worry: None     Inability: None    Transportation needs     Medical: None     Non-medical: None   Tobacco Use    Smoking status: Never Smoker    Smokeless tobacco: Never Used   Substance and Sexual Activity    Alcohol use: No     Alcohol/week: 0.0 standard drinks    Drug use: No    Sexual activity: None   Lifestyle    Physical activity     Days per week: None     Minutes per session: None    Stress: None   Relationships    Social connections     Talks on phone: None     Gets together: None     Attends Zoroastrian service: None     Active member of club or organization: None     Attends meetings of clubs or organizations: None     Relationship status: None    Intimate partner violence     Fear of current or ex partner: None     Emotionally abused: None     Physically abused: None     Forced sexual activity: None   Other Topics Concern    None   Social History Narrative    None     SCREENINGS         PHYSICAL EXAM  (up to 7 for level 4, 8 or more for level 5)   INITIAL VITALS: BP: (!) 141/83, Temp: 97.8 °F (36.6 °C), Pulse: 130, Resp: 24, SpO2: 90 %   Physical Exam  Vitals signs reviewed. Constitutional:       Appearance: She is well-developed. She is not toxic-appearing. Comments: Moderately frail appearing black female sitting up in bed. Answers questions appropriately and in complete sentences. HENT:      Head: Normocephalic and atraumatic. Right Ear: External ear normal.      Left Ear: External ear normal.   Eyes:      General: No scleral icterus. Extraocular Movements: Extraocular movements intact.       Conjunctiva/sclera: Conjunctivae normal.      Comments: Wearing glasses   Neck:      Musculoskeletal: Normal range of motion. Trachea: No tracheal deviation. Cardiovascular:      Rate and Rhythm: Tachycardia present. Pulmonary:      Effort: Pulmonary effort is normal. No respiratory distress. Breath sounds: Decreased breath sounds (Throughout, does improve with encouragement) present. No wheezing, rhonchi or rales. Abdominal:      Palpations: Abdomen is soft. Tenderness: There is no abdominal tenderness. There is no right CVA tenderness, left CVA tenderness or guarding. Musculoskeletal:      Right lower leg: She exhibits no tenderness. No edema. Left lower leg: She exhibits no tenderness. No edema. Feet:    Skin:     General: Skin is warm and dry. Capillary Refill: Capillary refill takes more than 3 seconds. Neurological:      Mental Status: She is alert. Psychiatric:         Mood and Affect: Mood normal.         Behavior: Behavior normal.                   DIAGNOSTIC RESULTS   EKG: All EKG's are interpreted by the Emergency Department Physician who either signs or Co-signs this chart in the absence of a cardiologist.  Indication: Shortness of breath  Interpretation: Rate tachycardic 116, rhythm sinus with occasional PACs noted, axis normal.  OH/QRS/QTc within normal limits. Nonspecific T wave abnormality noted anterior lateral leads. Comparison to prior EKG from July 14, 2014 shows no acute ischemic change noted. RADIOLOGY:   Interpretation per Radiologist below, if available at the time of this note:  XR FOOT LEFT (MIN 3 VIEWS)   Final Result   1. Overlying artifacts degrades study quality. 2. Nonspecific soft tissue edema may be postsurgical or related to   cellulitis, centered about the 2nd ray surgical site and hallux. 3. Osteoarthritis. 4. Hallux valgus deformity. 5. No definite acute osseous abnormality evident. XR CHEST PORTABLE   Final Result   Bilateral pulmonary disease, new from 2007.   This may be due to interstitial   disease, either acute interstitial edema or chronic interstitial disease. Diffuse infection can also have this appearance. LABS:  Labs Reviewed   CBC WITH AUTO DIFFERENTIAL - Abnormal; Notable for the following components:       Result Value    Hemoglobin 11.8 (*)     Hematocrit 35.7 (*)     RDW 18.0 (*)     Lymphocytes Absolute 0.6 (*)     All other components within normal limits    Narrative:     Performed at:  [address] [place] [address]   Phone [phone]   BASIC METABOLIC PANEL W/ REFLEX TO MG FOR LOW K - Abnormal; Notable for the following components:    Glucose 125 (*)     All other components within normal limits    Narrative:     Performed at:  [address] [facility] [place] [address]   Phone [phone]   HEPATIC FUNCTION PANEL - Abnormal; Notable for the following components:     Total Protein 5.6 (*)     Alb 2.7 (*)     All other components within normal limits    Narrative:     Performed at:  [address] [place] [address]   Phone [phone]   TROPONIN - Abnormal; Notable for the following components:    Troponin 0.03 (*)     All other components within normal limits    Narrative:     Performed at:  [address] [place] [address]   Phone [phone]   BRAIN NATRIURETIC PEPTIDE - Abnormal; Notable for the following components:    Pro- (*)     All other components within normal limits    Narrative:     Performed at:  [address] [place] [address]   Phone [phone]   SEDIMENTATION RATE - Abnormal; Notable for the following components:    Sed Rate 91 (*)     All other components within normal limits    Narrative:     Performed at:  [address] [place] [address] Phone [phone]   C-REACTIVE PROTEIN - Abnormal; Notable for the following components:    CRP 85.2 (*)     All other components within normal limits    Narrative:     Performed at:  [place] [address]   Phone [phone]   URINE RT REFLEX TO CULTURE - Abnormal; Notable for the following components:    Protein, UA 30 (*)     All other components within normal limits    Narrative:     Performed at:  [place] [address]   Phone [phone]   BLOOD GAS, VENOUS - Abnormal; Notable for the following components:    pH, Alfred 7.485 (*)     pCO2, Alfred 39.8 (*)     HCO3, Venous 30 (*)     All other components within normal limits    Narrative:     Performed at:  [address] [address]   Phone [phone]   LIPASE    Narrative:     Performed at:  [facility]  [address] [place] [address]   Phone [phone]   PROTIME-INR    Narrative:     Performed at:  [facility]  [address] [place] [address]   Phone [phone]   LACTIC ACID, PLASMA    Narrative:     Performed at:  [address] [address]   Phone [phone]   MICROSCOPIC URINALYSIS    Narrative:     Performed at:  [facility]  [address] [place] [address]   Phone [phone]   TROPONIN       All other labs were within normal range or not returned as of this dictation.     EMERGENCY DEPARTMENT COURSE and DIFFERENTIAL DIAGNOSIS/MDM:   Patient was given the following medications:  Orders Placed This Encounter   Medications    0.9 % sodium chloride bolus    aluminum & magnesium hydroxide-simethicone (MAALOX) 30 mL, lidocaine viscous hcl (XYLOCAINE) 5 mL (GI COCKTAIL)    acetaminophen (TYLENOL) tablet 650 mg    meropenem (MERREM) 500 mg IVPB (mini-bag)     Order Specific Question:   Antimicrobial Indications     Answer:   Skin and Soft Tissue Infection     CONSULTS:  IP CONSULT TO PRIMARY CARE PROVIDER  INITIAL VITALS: BP: (!) 141/83, Temp: 97.8 °F (36.6 °C), Pulse: 130, Resp: 24, SpO2: 90 %   RECENT VITALS:  BP: 137/70,Temp: 97.8 °F (36.6 °C), Pulse: 125, Resp: 23, SpO2: 90 %     Darvin Aaron is a 80 y.o. female who presents to the emergency department secondary to concern for increasing shortness of breath in the setting of known Covid positivity 11 days ago. A peripheral IV was placed, labs were ordered along with EKG, CXR, foot xray, tylenol, GI cocktail, and IVF. EKG without signs of acute ischemia. CXR shows bilateral pulmonary disease potentially acute or chronic - she has known covid currently. Labs showed elevated CRP/ESR (85.2/91) consistent with known foot infection for which she is currently on ertapenem for. Trop mildly elevated 0.03 similar to prior from [facility] and likely a chronic type 2 demand ischemic leak. Repeat troponin improved 0.02. UA without signs of acute infection. On reassessment her vitals showed oxygen saturation improved to 95% and HR improved to 94. She remained on room air. At that time she reported feeling better and stated she wanted to go home. We discussed waiting for urine results as well as a repeat troponin and if these were unremarkable she could likely be discharged home. I spoke with her primary care physician, Dr. Stephy Saab, at [facility] who was in agreement with discharge and following up with patient closely on Monday. Prior to discharge patient was gotten up, at that time her HR increased to 130 and oxygen saturation decreased to 87% and she became acutely hypoxic/tachypneic though she stated she did not feel that short of breat. She did improve once she was sitting down again.  RN was able to speak with the primary caregiver, patient's daughter, as well as a grandson who is here visiting from [place] to help take care of family members with Covid and after further discussion with them they felt admission to the hospital right now would be safer than returning home due to caregivers being ill. Discussed with pharmacy her antibiotic regimen and she was changed to meropenem due to her now being inpatient. Admitted to hospitalist.     CRITICAL CARE TIME   Due to the immediate potential for life-threatening deterioration due to abnormal labs and covid, I spent 48 minutes providing critical care. There was a high probability of clinically significant/life threatening deterioration in the patient's condition which required my urgent intervention. This time excludes time spent performing procedures but includes time spent on direct patient care, history retrieval, review of the chart, and discussions with patient, family, and consultant(s). FINAL IMPRESSION      1. Shortness of breath    2. COVID-19    3.  Amputated toe of left foot ([address]        DISPOSITION/PLAN   DISPOSITION  Admission     (Please note that portions of this note were completed with a voice recognition program. Efforts were made to edit the dictations but occasionally words are mis-transcribed.)    Brennan Izquierdo MD (electronically signed)  Attending Emergency Physician        Brennan Izquierdo MD  11/25/20 7606

## 2020-11-25 NOTE — ED NOTES
Dressing removed from left foot. Left 2nd toe amputation noted.  Pt states surgery was a couple weeks ago at [facility].  Dr. Mireya Hector bedside, photo placed in chart     Giancarlo Sykes RN  11/25/20 9352

## 2020-11-25 NOTE — ED NOTES
Pt stood up to use bsc with standby assist  HR 130s  SaO2 87-88% while standing    Dr. Feliz Graham made aware       Ophelia Knapp RN  11/25/20 7040

## 2020-11-25 NOTE — H&P
the left eye nightly   Yes Historical Provider, MD   brimonidine (ALPHAGAN) 0.2 % ophthalmic solution Place 1 drop into the left eye 3 times daily   Yes Historical Provider, MD   ASPIRIN LOW DOSE 81 MG EC tablet Take 81 mg by mouth daily 11/5/20   Historical Provider, MD   acetaminophen (TYLENOL) 325 MG tablet Take 650 mg by mouth every 6 hours as needed. Historical Provider, MD       Allergies:  Patient has no known allergies. Social History:      TOBACCO:   reports that she has never smoked. She has never used smokeless tobacco.  ETOH:   reports no history of alcohol use. Family History:       Reviewed in detail and non contributory          Problem Relation Age of Onset    Cancer Father         colon       REVIEW OF SYSTEMS:   Pertinent positives as noted in the HPI. All other systems reviewed and negative. PHYSICAL EXAM PERFORMED:    BP (!) 146/64   Pulse 118   Temp 97.8 °F (36.6 °C) (Oral)   Resp 21   Ht 5' 4\" (1.626 m)   Wt 139 lb 15.9 oz (63.5 kg)   SpO2 95%   BMI 24.03 kg/m²     General appearance:  No apparent distress, cooperative. HEENT:  Normal cephalic, atraumatic without obvious deformity. Conjunctivae/corneas clear. Neck: Supple, with full range of motion. No cervical lymphadenopathy  Respiratory:  Normal respiratory effort. Clear to auscultation, bilaterally without Rales/Wheezes/Rhonchi. Cardiovascular:  Regular rate and rhythm with normal S1/S2 without murmurs, rubs or gallops. Abdomen: Soft, non-tender, non-distended, normal bowel sounds. Musculoskeletal:  No edema noted bilaterally. No tenderness on palpation   Skin: no rash visible  Neurologic:  Neurologically intact without any focal sensory/motor deficits.   grossly non-focal.  Psychiatric:  Alert and oriented, normal mood  Peripheral Pulses: +2 palpable, equal bilaterally       Labs:     Recent Labs     11/25/20  1147   WBC 5.1   HGB 11.8*   HCT 35.7*        Recent Labs     11/25/20  1147      K 3.7    CO2 27   BUN 8   CREATININE 0.8   CALCIUM 9.2     Recent Labs     11/25/20  1147   AST 28   ALT 26   BILIDIR <0.2   BILITOT 0.3   ALKPHOS 91     Recent Labs     11/25/20  1147   INR 1.09     Recent Labs     11/25/20  1147 11/25/20  1609   TROPONINI 0.03* 0.02*       Urinalysis:      Lab Results   Component Value Date    NITRU Negative 11/25/2020    WBCUA 2 11/25/2020    RBCUA 1 11/25/2020    BLOODU Negative 11/25/2020    SPECGRAV 1.014 11/25/2020    GLUCOSEU Negative 11/25/2020       Radiology:       XR FOOT LEFT (MIN 3 VIEWS)   Final Result   1. Overlying artifacts degrades study quality. 2. Nonspecific soft tissue edema may be postsurgical or related to   cellulitis, centered about the 2nd ray surgical site and hallux. 3. Osteoarthritis. 4. Hallux valgus deformity. 5. No definite acute osseous abnormality evident. XR CHEST PORTABLE   Final Result   Bilateral pulmonary disease, new from 2007. This may be due to interstitial   disease, either acute interstitial edema or chronic interstitial disease. Diffuse infection can also have this appearance. Active Hospital Problems    Diagnosis Date Noted    Hypoxia [R09.02] 11/25/2020     Patient is a 66-year-old female with past medical history of CKD, gout, hyperlipidemia and recent left second toe amputation at [facility] who presents to the hospital today for trouble breathing. According to the patient she was tested positive for COVID-19 recently around 11/14. Patient underwent surgery, patient was discharged home, at home patient is started having difficulty breathing. Patient also mentions she dry cough, denies fevers chills diarrhea constipation dysuria.     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

## 2020-11-26 LAB
A/G RATIO: 0.9 (ref 1.1–2.2)
ALBUMIN SERPL-MCNC: 2.6 G/DL (ref 3.4–5)
ALP BLD-CCNC: 84 U/L (ref 40–129)
ALT SERPL-CCNC: 21 U/L (ref 10–40)
ANION GAP SERPL CALCULATED.3IONS-SCNC: 11 MMOL/L (ref 3–16)
AST SERPL-CCNC: 21 U/L (ref 15–37)
BILIRUB SERPL-MCNC: 0.4 MG/DL (ref 0–1)
BUN BLDV-MCNC: 8 MG/DL (ref 7–20)
CALCIUM SERPL-MCNC: 8.7 MG/DL (ref 8.3–10.6)
CHLORIDE BLD-SCNC: 105 MMOL/L (ref 99–110)
CO2: 27 MMOL/L (ref 21–32)
CREAT SERPL-MCNC: 0.8 MG/DL (ref 0.6–1.2)
EKG ATRIAL RATE: 111 BPM
EKG DIAGNOSIS: NORMAL
EKG P AXIS: 63 DEGREES
EKG P-R INTERVAL: 136 MS
EKG Q-T INTERVAL: 330 MS
EKG QRS DURATION: 72 MS
EKG QTC CALCULATION (BAZETT): 448 MS
EKG R AXIS: 2 DEGREES
EKG T AXIS: 23 DEGREES
EKG VENTRICULAR RATE: 111 BPM
GFR AFRICAN AMERICAN: >60
GFR NON-AFRICAN AMERICAN: >60
GLOBULIN: 2.9 G/DL
GLUCOSE BLD-MCNC: 131 MG/DL (ref 70–99)
OCCULT BLOOD SCREENING: NORMAL
POTASSIUM SERPL-SCNC: 4.2 MMOL/L (ref 3.5–5.1)
PRO-BNP: 1525 PG/ML (ref 0–449)
PROCALCITONIN: 0.15 NG/ML (ref 0–0.15)
SODIUM BLD-SCNC: 143 MMOL/L (ref 136–145)
TOTAL PROTEIN: 5.5 G/DL (ref 6.4–8.2)
TROPONIN: 0.02 NG/ML

## 2020-11-26 PROCEDURE — 6360000002 HC RX W HCPCS: Performed by: INTERNAL MEDICINE

## 2020-11-26 PROCEDURE — 2500000003 HC RX 250 WO HCPCS: Performed by: NURSE PRACTITIONER

## 2020-11-26 PROCEDURE — 2500000003 HC RX 250 WO HCPCS: Performed by: INTERNAL MEDICINE

## 2020-11-26 PROCEDURE — XW033E5 INTRODUCTION OF REMDESIVIR ANTI-INFECTIVE INTO PERIPHERAL VEIN, PERCUTANEOUS APPROACH, NEW TECHNOLOGY GROUP 5: ICD-10-PCS | Performed by: INTERNAL MEDICINE

## 2020-11-26 PROCEDURE — 2580000003 HC RX 258: Performed by: INTERNAL MEDICINE

## 2020-11-26 PROCEDURE — 93005 ELECTROCARDIOGRAM TRACING: CPT

## 2020-11-26 PROCEDURE — 93010 ELECTROCARDIOGRAM REPORT: CPT | Performed by: INTERNAL MEDICINE

## 2020-11-26 PROCEDURE — 99223 1ST HOSP IP/OBS HIGH 75: CPT | Performed by: INTERNAL MEDICINE

## 2020-11-26 PROCEDURE — 94640 AIRWAY INHALATION TREATMENT: CPT

## 2020-11-26 PROCEDURE — 83880 ASSAY OF NATRIURETIC PEPTIDE: CPT

## 2020-11-26 PROCEDURE — 94761 N-INVAS EAR/PLS OXIMETRY MLT: CPT

## 2020-11-26 PROCEDURE — 2060000000 HC ICU INTERMEDIATE R&B

## 2020-11-26 PROCEDURE — 84145 PROCALCITONIN (PCT): CPT

## 2020-11-26 PROCEDURE — 6370000000 HC RX 637 (ALT 250 FOR IP): Performed by: INTERNAL MEDICINE

## 2020-11-26 PROCEDURE — 80053 COMPREHEN METABOLIC PANEL: CPT

## 2020-11-26 PROCEDURE — G0328 FECAL BLOOD SCRN IMMUNOASSAY: HCPCS

## 2020-11-26 RX ORDER — 0.9 % SODIUM CHLORIDE 0.9 %
30 INTRAVENOUS SOLUTION INTRAVENOUS PRN
Status: DISCONTINUED | OUTPATIENT
Start: 2020-11-26 | End: 2020-11-28 | Stop reason: HOSPADM

## 2020-11-26 RX ORDER — METHYLPREDNISOLONE SODIUM SUCCINATE 40 MG/ML
40 INJECTION, POWDER, LYOPHILIZED, FOR SOLUTION INTRAMUSCULAR; INTRAVENOUS DAILY
Status: DISCONTINUED | OUTPATIENT
Start: 2020-11-26 | End: 2020-11-28 | Stop reason: HOSPADM

## 2020-11-26 RX ORDER — ACETAMINOPHEN 650 MG/1
650 SUPPOSITORY RECTAL EVERY 6 HOURS PRN
Status: DISCONTINUED | OUTPATIENT
Start: 2020-11-26 | End: 2020-11-28 | Stop reason: HOSPADM

## 2020-11-26 RX ORDER — PROMETHAZINE HYDROCHLORIDE 25 MG/1
12.5 TABLET ORAL EVERY 6 HOURS PRN
Status: DISCONTINUED | OUTPATIENT
Start: 2020-11-26 | End: 2020-11-28 | Stop reason: HOSPADM

## 2020-11-26 RX ORDER — POTASSIUM CHLORIDE 7.45 MG/ML
10 INJECTION INTRAVENOUS PRN
Status: DISCONTINUED | OUTPATIENT
Start: 2020-11-26 | End: 2020-11-28 | Stop reason: HOSPADM

## 2020-11-26 RX ORDER — SODIUM CHLORIDE 0.9 % (FLUSH) 0.9 %
10 SYRINGE (ML) INJECTION PRN
Status: DISCONTINUED | OUTPATIENT
Start: 2020-11-26 | End: 2020-11-28 | Stop reason: HOSPADM

## 2020-11-26 RX ORDER — MAGNESIUM SULFATE IN WATER 40 MG/ML
2 INJECTION, SOLUTION INTRAVENOUS PRN
Status: DISCONTINUED | OUTPATIENT
Start: 2020-11-26 | End: 2020-11-28 | Stop reason: HOSPADM

## 2020-11-26 RX ORDER — ONDANSETRON 2 MG/ML
4 INJECTION INTRAMUSCULAR; INTRAVENOUS EVERY 6 HOURS PRN
Status: DISCONTINUED | OUTPATIENT
Start: 2020-11-26 | End: 2020-11-28 | Stop reason: HOSPADM

## 2020-11-26 RX ORDER — ACETAMINOPHEN 325 MG/1
650 TABLET ORAL EVERY 6 HOURS PRN
Status: DISCONTINUED | OUTPATIENT
Start: 2020-11-26 | End: 2020-11-28 | Stop reason: HOSPADM

## 2020-11-26 RX ORDER — METOPROLOL TARTRATE 5 MG/5ML
5 INJECTION INTRAVENOUS ONCE
Status: COMPLETED | OUTPATIENT
Start: 2020-11-26 | End: 2020-11-26

## 2020-11-26 RX ORDER — SODIUM CHLORIDE 0.9 % (FLUSH) 0.9 %
10 SYRINGE (ML) INJECTION EVERY 12 HOURS SCHEDULED
Status: DISCONTINUED | OUTPATIENT
Start: 2020-11-26 | End: 2020-11-28 | Stop reason: HOSPADM

## 2020-11-26 RX ADMIN — Medication 1000 UNITS: at 09:25

## 2020-11-26 RX ADMIN — PREDNISONE 10 MG: 5 TABLET ORAL at 09:25

## 2020-11-26 RX ADMIN — ALTEPLASE 1 MG: 2.2 INJECTION, POWDER, LYOPHILIZED, FOR SOLUTION INTRAVENOUS at 23:11

## 2020-11-26 RX ADMIN — BRIMONIDINE TARTRATE 1 DROP: 2 SOLUTION OPHTHALMIC at 09:26

## 2020-11-26 RX ADMIN — IPRATROPIUM BROMIDE AND ALBUTEROL 1 PUFF: 20; 100 SPRAY, METERED RESPIRATORY (INHALATION) at 20:30

## 2020-11-26 RX ADMIN — CLOPIDOGREL BISULFATE 75 MG: 75 TABLET ORAL at 16:14

## 2020-11-26 RX ADMIN — METOPROLOL TARTRATE 5 MG: 5 INJECTION INTRAVENOUS at 00:52

## 2020-11-26 RX ADMIN — ATORVASTATIN CALCIUM 40 MG: 40 TABLET, FILM COATED ORAL at 09:25

## 2020-11-26 RX ADMIN — BRIMONIDINE TARTRATE 1 DROP: 2 SOLUTION OPHTHALMIC at 22:05

## 2020-11-26 RX ADMIN — MEROPENEM 500 MG: 500 INJECTION, POWDER, FOR SOLUTION INTRAVENOUS at 01:36

## 2020-11-26 RX ADMIN — MEROPENEM 500 MG: 500 INJECTION, POWDER, FOR SOLUTION INTRAVENOUS at 18:29

## 2020-11-26 RX ADMIN — Medication 10 ML: at 09:26

## 2020-11-26 RX ADMIN — IPRATROPIUM BROMIDE AND ALBUTEROL 1 PUFF: 20; 100 SPRAY, METERED RESPIRATORY (INHALATION) at 16:12

## 2020-11-26 RX ADMIN — COLCHICINE 0.6 MG: 0.6 TABLET, FILM COATED ORAL at 09:25

## 2020-11-26 RX ADMIN — REMDESIVIR 200 MG: 100 INJECTION, POWDER, LYOPHILIZED, FOR SOLUTION INTRAVENOUS at 17:28

## 2020-11-26 RX ADMIN — Medication 10 ML: at 22:00

## 2020-11-26 RX ADMIN — METHYLPREDNISOLONE SODIUM SUCCINATE 40 MG: 40 INJECTION, POWDER, FOR SOLUTION INTRAMUSCULAR; INTRAVENOUS at 16:14

## 2020-11-26 RX ADMIN — LATANOPROST 1 DROP: 50 SOLUTION OPHTHALMIC at 22:05

## 2020-11-26 RX ADMIN — ENOXAPARIN SODIUM 30 MG: 30 INJECTION SUBCUTANEOUS at 22:00

## 2020-11-26 RX ADMIN — MEROPENEM 500 MG: 500 INJECTION, POWDER, FOR SOLUTION INTRAVENOUS at 11:04

## 2020-11-26 RX ADMIN — IPRATROPIUM BROMIDE AND ALBUTEROL 1 PUFF: 20; 100 SPRAY, METERED RESPIRATORY (INHALATION) at 09:53

## 2020-11-26 RX ADMIN — IPRATROPIUM BROMIDE AND ALBUTEROL 1 PUFF: 20; 100 SPRAY, METERED RESPIRATORY (INHALATION) at 12:01

## 2020-11-26 RX ADMIN — GABAPENTIN 100 MG: 100 CAPSULE ORAL at 21:59

## 2020-11-26 RX ADMIN — BRIMONIDINE TARTRATE 1 DROP: 2 SOLUTION OPHTHALMIC at 16:17

## 2020-11-26 RX ADMIN — ASPIRIN 81 MG: 81 TABLET, FILM COATED ORAL at 16:13

## 2020-11-26 RX ADMIN — GABAPENTIN 100 MG: 100 CAPSULE ORAL at 09:25

## 2020-11-26 ASSESSMENT — PAIN SCALES - GENERAL
PAINLEVEL_OUTOF10: 0

## 2020-11-26 NOTE — PROGRESS NOTES
Hospitalist Progress Note      PCP: Anastasia Veliz MD    Date of Admission: 11/25/2020    Chief Complaint: Shortness of breath    Hospital Course: 70-year-old female with past medical history of CKD, gout, hyperlipidemia and recent left second toe amputation at [facility] who presents to the hospital today for trouble breathing. Subjective: No new complaints this morning. Shortness of breath and cough has been improving. Denies any fever or chills      Medications:  Reviewed    Infusion Medications   Scheduled Medications    sodium chloride flush  10 mL Intravenous 2 times per day    enoxaparin  40 mg Subcutaneous Daily    methylPREDNISolone  40 mg Intravenous Daily    aspirin  81 mg Oral Daily    atorvastatin  40 mg Oral Daily    brimonidine  1 drop Left Eye TID    clopidogrel  75 mg Oral Daily    colchicine  0.6 mg Oral Daily    gabapentin  100 mg Oral BID    latanoprost  1 drop Left Eye Nightly    Vitamin D  1,000 Units Oral Daily    meropenem  500 mg Intravenous Q8H    albuterol-ipratropium  1 puff Inhalation 4x daily     PRN Meds: sodium chloride flush, potassium chloride, magnesium sulfate, acetaminophen **OR** acetaminophen, magnesium hydroxide, promethazine **OR** ondansetron      Intake/Output Summary (Last 24 hours) at 11/26/2020 1208  Last data filed at 11/26/2020 0404  Gross per 24 hour   Intake 1340 ml   Output 900 ml   Net 440 ml       Physical Exam Performed:    /67   Pulse 105   Temp 98.2 °F (36.8 °C) (Oral)   Resp 18   Ht 5' 4\" (1.626 m)   Wt 129 lb 10.1 oz (58.8 kg)   SpO2 95%   BMI 22.25 kg/m²     General appearance: No apparent distress, appears stated age and cooperative. HEENT: Pupils equal, round, and reactive to light. Conjunctivae/corneas clear. Neck: Supple, with full range of motion. No jugular venous distention. Trachea midline. Respiratory:  Normal respiratory effort.  Clear to auscultation, bilaterally without

## 2020-11-26 NOTE — CONSULTS
REASON FOR CONSULTATION/CC: Hypoxia, COVID-19      Consult at request of Clarissa Sweeney MD     PCP: Trudi Mendoza MD  Established Pulmonologist:  None    Chief Complaint   Patient presents with    Shortness of Breath     for a couple days, COVID +, SaO2 89-90 % on RA       HISTORY OF PRESENT ILLNESS: Lexii Beal is a 80y.o. year old female with a history of CKD and gout and recent toe amputation who presents with progressive shortness of breath. Patient initially chest positive for COVID-19 approximately 12 days ago. Following her to amputation last week she went home and had progressive shortness of breath. She also has associated symptoms of dry nonproductive cough, though she denies subjective fevers. She has not found anything that makes it better or worse. Past Medical History:   Diagnosis Date    Chronic kidney disease     Gout     Hyperlipidemia     Hypertension          Past Surgical History:   Procedure Laterality Date    APPENDECTOMY      EYE SURGERY  2004    XRT plaque OD for melanoma    FOOT SURGERY      left       Family Hx  family history includes Cancer in her father. Social Hx   reports that she has never smoked.  She has never used smokeless tobacco.    Scheduled Meds:   sodium chloride flush  10 mL Intravenous 2 times per day    enoxaparin  40 mg Subcutaneous Daily    aspirin  81 mg Oral Daily    atorvastatin  40 mg Oral Daily    brimonidine  1 drop Left Eye TID    clopidogrel  75 mg Oral Daily    colchicine  0.6 mg Oral Daily    gabapentin  100 mg Oral BID    latanoprost  1 drop Left Eye Nightly    predniSONE  10 mg Oral Daily    Vitamin D  1,000 Units Oral Daily    meropenem  500 mg Intravenous Q8H    albuterol-ipratropium  1 puff Inhalation 4x daily       Continuous Infusions:      PRN Meds:  sodium chloride flush, potassium chloride, magnesium sulfate, acetaminophen **OR** acetaminophen, magnesium hydroxide, promethazine **OR** ondansetron    ALLERGIES:  Patient has No Known Allergies. REVIEW OF SYSTEMS:  Constitutional: Negative for fever  HENT: Negative for sore throat  Eyes: Negative for redness   Respiratory: Negative for dyspnea, cough  Cardiovascular: Negative for chest pain  Gastrointestinal: Negative for vomiting, diarrhea   Genitourinary: Negative for hematuria   Musculoskeletal: Negative for arthralgias   Skin: Negative for rash  Neurological: Negative for syncope  Hematological: Negative for adenopathy  Psychiatric/Behavorial: Negative for anxiety    Objective:   PHYSICAL EXAM:  Blood pressure 139/67, pulse 105, temperature 98.2 °F (36.8 °C), temperature source Oral, resp. rate 18, height 5' 4\" (1.626 m), weight 129 lb 10.1 oz (58.8 kg), SpO2 96 %.'  Gen:  No acute distress. Frail appearing  Eyes: PERRL. Anicteric sclera. No conjunctival injection. ENT: No discharge. Posterior oropharynx clear. External appearance of ears and nose normal.  Neck: Trachea midline. No mass   Resp:  No crackles. No wheezes. No rhonchi. No dullness on percussion. CV: Regular rate. Regular rhythm. No murmur or rub. No edema. GI: Soft, Non-tender. Non-distended. +BS  Skin: Warm, dry, w/o erythema. Lymph: No cervical or supraclavicular LAD. M/S: No cyanosis. No clubbing. Neuro:  CN 2-12 tested, no focal neurologic deficit. Moves all extremities  Psych: Awake and alert, Oriented x 3. Judgement and insight appropriate. Mood stable. Data Reviewed:   LABS:  CBC:   Recent Labs     11/25/20  1147   WBC 5.1   HGB 11.8*   HCT 35.7*   MCV 87.3        BMP:   Recent Labs     11/25/20  1147      K 3.7      CO2 27   BUN 8   CREATININE 0.8     LIVER PROFILE:   Recent Labs     11/25/20  1147   AST 28   ALT 26   LIPASE 23.0   BILIDIR <0.2   BILITOT 0.3   ALKPHOS 91     PT/INR:   Recent Labs     11/25/20  1147   PROTIME 12.6   INR 1.09     APTT: No results for input(s): APTT in the last 72 hours.   UA:  Recent Labs     11/25/20  1609   COLORU YELLOW   PHUR 7.0

## 2020-11-27 LAB
ALBUMIN SERPL-MCNC: 2.4 G/DL (ref 3.4–5)
ALP BLD-CCNC: 89 U/L (ref 40–129)
ALT SERPL-CCNC: 19 U/L (ref 10–40)
ANION GAP SERPL CALCULATED.3IONS-SCNC: 12 MMOL/L (ref 3–16)
AST SERPL-CCNC: 17 U/L (ref 15–37)
BILIRUB SERPL-MCNC: <0.2 MG/DL (ref 0–1)
BILIRUBIN DIRECT: <0.2 MG/DL (ref 0–0.3)
BILIRUBIN, INDIRECT: ABNORMAL MG/DL (ref 0–1)
BUN BLDV-MCNC: 14 MG/DL (ref 7–20)
CALCIUM SERPL-MCNC: 8.6 MG/DL (ref 8.3–10.6)
CHLORIDE BLD-SCNC: 105 MMOL/L (ref 99–110)
CO2: 25 MMOL/L (ref 21–32)
CREAT SERPL-MCNC: 0.9 MG/DL (ref 0.6–1.2)
GFR AFRICAN AMERICAN: >60
GFR NON-AFRICAN AMERICAN: 59
GLUCOSE BLD-MCNC: 192 MG/DL (ref 70–99)
GLUCOSE BLD-MCNC: 302 MG/DL (ref 70–99)
HCT VFR BLD CALC: 36 % (ref 36–48)
HEMOGLOBIN: 11.8 G/DL (ref 12–16)
MCH RBC QN AUTO: 28.3 PG (ref 26–34)
MCHC RBC AUTO-ENTMCNC: 32.7 G/DL (ref 31–36)
MCV RBC AUTO: 86.5 FL (ref 80–100)
PDW BLD-RTO: 18.1 % (ref 12.4–15.4)
PERFORMED ON: ABNORMAL
PLATELET # BLD: 282 K/UL (ref 135–450)
PMV BLD AUTO: 8.3 FL (ref 5–10.5)
POTASSIUM REFLEX MAGNESIUM: 3.8 MMOL/L (ref 3.5–5.1)
PROCALCITONIN: 0.12 NG/ML (ref 0–0.15)
RBC # BLD: 4.16 M/UL (ref 4–5.2)
SODIUM BLD-SCNC: 142 MMOL/L (ref 136–145)
TOTAL PROTEIN: 5.3 G/DL (ref 6.4–8.2)
WBC # BLD: 6.1 K/UL (ref 4–11)

## 2020-11-27 PROCEDURE — 6370000000 HC RX 637 (ALT 250 FOR IP): Performed by: INTERNAL MEDICINE

## 2020-11-27 PROCEDURE — 97162 PT EVAL MOD COMPLEX 30 MIN: CPT

## 2020-11-27 PROCEDURE — 6360000002 HC RX W HCPCS: Performed by: INTERNAL MEDICINE

## 2020-11-27 PROCEDURE — 97166 OT EVAL MOD COMPLEX 45 MIN: CPT

## 2020-11-27 PROCEDURE — 97530 THERAPEUTIC ACTIVITIES: CPT

## 2020-11-27 PROCEDURE — 80076 HEPATIC FUNCTION PANEL: CPT

## 2020-11-27 PROCEDURE — 99232 SBSQ HOSP IP/OBS MODERATE 35: CPT | Performed by: INTERNAL MEDICINE

## 2020-11-27 PROCEDURE — 94761 N-INVAS EAR/PLS OXIMETRY MLT: CPT

## 2020-11-27 PROCEDURE — 6370000000 HC RX 637 (ALT 250 FOR IP): Performed by: NURSE PRACTITIONER

## 2020-11-27 PROCEDURE — 94640 AIRWAY INHALATION TREATMENT: CPT

## 2020-11-27 PROCEDURE — 80048 BASIC METABOLIC PNL TOTAL CA: CPT

## 2020-11-27 PROCEDURE — 2500000003 HC RX 250 WO HCPCS: Performed by: INTERNAL MEDICINE

## 2020-11-27 PROCEDURE — 97116 GAIT TRAINING THERAPY: CPT

## 2020-11-27 PROCEDURE — 84145 PROCALCITONIN (PCT): CPT

## 2020-11-27 PROCEDURE — 1200000000 HC SEMI PRIVATE

## 2020-11-27 PROCEDURE — 85027 COMPLETE CBC AUTOMATED: CPT

## 2020-11-27 PROCEDURE — 2580000003 HC RX 258: Performed by: INTERNAL MEDICINE

## 2020-11-27 PROCEDURE — 51798 US URINE CAPACITY MEASURE: CPT

## 2020-11-27 PROCEDURE — 97535 SELF CARE MNGMENT TRAINING: CPT

## 2020-11-27 RX ORDER — NICOTINE POLACRILEX 4 MG
15 LOZENGE BUCCAL PRN
Status: DISCONTINUED | OUTPATIENT
Start: 2020-11-27 | End: 2020-11-28 | Stop reason: HOSPADM

## 2020-11-27 RX ORDER — DEXTROSE MONOHYDRATE 50 MG/ML
100 INJECTION, SOLUTION INTRAVENOUS PRN
Status: DISCONTINUED | OUTPATIENT
Start: 2020-11-27 | End: 2020-11-28 | Stop reason: HOSPADM

## 2020-11-27 RX ORDER — DEXTROSE MONOHYDRATE 25 G/50ML
12.5 INJECTION, SOLUTION INTRAVENOUS PRN
Status: DISCONTINUED | OUTPATIENT
Start: 2020-11-27 | End: 2020-11-28 | Stop reason: HOSPADM

## 2020-11-27 RX ORDER — LACTOBACILLUS RHAMNOSUS GG 10B CELL
1 CAPSULE ORAL 2 TIMES DAILY WITH MEALS
Status: DISCONTINUED | OUTPATIENT
Start: 2020-11-27 | End: 2020-11-28 | Stop reason: HOSPADM

## 2020-11-27 RX ADMIN — ASPIRIN 81 MG: 81 TABLET, FILM COATED ORAL at 08:41

## 2020-11-27 RX ADMIN — IPRATROPIUM BROMIDE AND ALBUTEROL 1 PUFF: 20; 100 SPRAY, METERED RESPIRATORY (INHALATION) at 15:52

## 2020-11-27 RX ADMIN — METHYLPREDNISOLONE SODIUM SUCCINATE 40 MG: 40 INJECTION, POWDER, FOR SOLUTION INTRAMUSCULAR; INTRAVENOUS at 08:42

## 2020-11-27 RX ADMIN — IPRATROPIUM BROMIDE AND ALBUTEROL 1 PUFF: 20; 100 SPRAY, METERED RESPIRATORY (INHALATION) at 12:15

## 2020-11-27 RX ADMIN — GABAPENTIN 100 MG: 100 CAPSULE ORAL at 20:43

## 2020-11-27 RX ADMIN — MEROPENEM 500 MG: 500 INJECTION, POWDER, FOR SOLUTION INTRAVENOUS at 09:43

## 2020-11-27 RX ADMIN — MEROPENEM 500 MG: 500 INJECTION, POWDER, FOR SOLUTION INTRAVENOUS at 17:20

## 2020-11-27 RX ADMIN — ATORVASTATIN CALCIUM 40 MG: 40 TABLET, FILM COATED ORAL at 08:41

## 2020-11-27 RX ADMIN — ENOXAPARIN SODIUM 30 MG: 30 INJECTION SUBCUTANEOUS at 08:41

## 2020-11-27 RX ADMIN — LATANOPROST 1 DROP: 50 SOLUTION OPHTHALMIC at 20:44

## 2020-11-27 RX ADMIN — INSULIN LISPRO 2 UNITS: 100 INJECTION, SOLUTION INTRAVENOUS; SUBCUTANEOUS at 20:43

## 2020-11-27 RX ADMIN — ENOXAPARIN SODIUM 30 MG: 30 INJECTION SUBCUTANEOUS at 20:43

## 2020-11-27 RX ADMIN — MEROPENEM 500 MG: 500 INJECTION, POWDER, FOR SOLUTION INTRAVENOUS at 01:09

## 2020-11-27 RX ADMIN — Medication 1 CAPSULE: at 17:20

## 2020-11-27 RX ADMIN — Medication 1000 UNITS: at 08:41

## 2020-11-27 RX ADMIN — BRIMONIDINE TARTRATE 1 DROP: 2 SOLUTION OPHTHALMIC at 20:44

## 2020-11-27 RX ADMIN — GABAPENTIN 100 MG: 100 CAPSULE ORAL at 08:40

## 2020-11-27 RX ADMIN — COLCHICINE 0.6 MG: 0.6 TABLET, FILM COATED ORAL at 08:41

## 2020-11-27 RX ADMIN — ACETAMINOPHEN 650 MG: 325 TABLET ORAL at 00:41

## 2020-11-27 RX ADMIN — Medication 1 CAPSULE: at 11:59

## 2020-11-27 RX ADMIN — BRIMONIDINE TARTRATE 1 DROP: 2 SOLUTION OPHTHALMIC at 09:00

## 2020-11-27 RX ADMIN — IPRATROPIUM BROMIDE AND ALBUTEROL 1 PUFF: 20; 100 SPRAY, METERED RESPIRATORY (INHALATION) at 20:57

## 2020-11-27 RX ADMIN — REMDESIVIR 100 MG: 100 INJECTION, POWDER, LYOPHILIZED, FOR SOLUTION INTRAVENOUS at 15:30

## 2020-11-27 RX ADMIN — CLOPIDOGREL BISULFATE 75 MG: 75 TABLET ORAL at 08:41

## 2020-11-27 RX ADMIN — IPRATROPIUM BROMIDE AND ALBUTEROL 1 PUFF: 20; 100 SPRAY, METERED RESPIRATORY (INHALATION) at 08:25

## 2020-11-27 RX ADMIN — BRIMONIDINE TARTRATE 1 DROP: 2 SOLUTION OPHTHALMIC at 14:21

## 2020-11-27 RX ADMIN — Medication 10 ML: at 08:56

## 2020-11-27 RX ADMIN — Medication 10 ML: at 20:44

## 2020-11-27 ASSESSMENT — PAIN SCALES - GENERAL
PAINLEVEL_OUTOF10: 0
PAINLEVEL_OUTOF10: 4
PAINLEVEL_OUTOF10: 4
PAINLEVEL_OUTOF10: 7
PAINLEVEL_OUTOF10: 0

## 2020-11-27 ASSESSMENT — PAIN DESCRIPTION - ORIENTATION: ORIENTATION: LEFT

## 2020-11-27 ASSESSMENT — PAIN DESCRIPTION - LOCATION: LOCATION: FOOT

## 2020-11-27 ASSESSMENT — PAIN - FUNCTIONAL ASSESSMENT: PAIN_FUNCTIONAL_ASSESSMENT: PREVENTS OR INTERFERES WITH MANY ACTIVE NOT PASSIVE ACTIVITIES

## 2020-11-27 ASSESSMENT — PAIN DESCRIPTION - PAIN TYPE: TYPE: ACUTE PAIN

## 2020-11-27 ASSESSMENT — PAIN DESCRIPTION - PROGRESSION: CLINICAL_PROGRESSION: GRADUALLY WORSENING

## 2020-11-27 ASSESSMENT — PAIN DESCRIPTION - ONSET: ONSET: GRADUAL

## 2020-11-27 NOTE — ACP (ADVANCE CARE PLANNING)
Advance Care Planning     Advance Care Planning Activator (Inpatient)  Conversation Note      Date of ACP Conversation: 11/27/2020    Conversation Conducted with: Patient with Decision Making Capacity    ACP Activator: C/ Mary Kate 29 Decision Maker:     Current Designated Health Care Decision Maker:   Primary Decision Maker: Nik Heather - Child - [phone]    Primary Decision Maker: Ross Nj Child - [phone]    Care Preferences    Ventilation: \"If you were in your present state of health and suddenly became very ill and were unable to breathe on your own, what would your preference be about the use of a ventilator (breathing machine) if it were available to you? \"      Would the patient desire the use of ventilator (breathing machine)?: Unsure    \"If your health worsens and it becomes clear that your chance of recovery is unlikely, what would your preference be about the use of a ventilator (breathing machine) if it were available to you? \"     Would the patient desire the use of ventilator (breathing machine)?: Unsure       Resuscitation  \"CPR works best to restart the heart when there is a sudden event, like a heart attack, in someone who is otherwise healthy. Unfortunately, CPR does not typically restart the heart for people who have serious health conditions or who are very sick. \"    \"In the event your heart stopped as a result of an underlying serious health condition, would you want attempts to be made to restart your heart (answer \"yes\" for attempt to resuscitate) or would you prefer a natural death (answer \"no\" for do not attempt to resuscitate)? \" Unsure      [] Yes   [x] No   Educated Patient / Quinn Shahid regarding differences between Advance Directives and portable DNR orders.     Length of ACP Conversation in minutes:  10    Conversation Outcomes:  [x] ACP discussion completed  [] Existing advance directive reviewed with patient; no changes to patient's previously recorded wishes  [] New Advance Directive completed  [] Portable Do Not Rescitate prepared for Provider review and signature  [] POLST/POST/MOLST/MOST prepared for Provider review and signature      Follow-up plan:    [] Schedule follow-up conversation to continue planning  [] Referred individual to Provider for additional questions/concerns   [] Advised patient/agent/surrogate to review completed ACP document and update if needed with changes in condition, patient preferences or care setting    [x] This note routed to one or more involved healthcare providers       Electronically signed by MILTON Barba, RAI on 11/27/2020 at 4:33 PM

## 2020-11-27 NOTE — PLAN OF CARE
Problem: Nutrition  Goal: Optimal nutrition therapy  Outcome: Ongoing   Nutrition Problem #1: Increased nutrient needs  Intervention: Food and/or Nutrient Delivery: Continue Current Diet, Start Oral Nutrition Supplement  Nutritional Goals: consume >50% of meals and supplement during admission

## 2020-11-27 NOTE — PROGRESS NOTES
Occupational Therapy   Occupational Therapy Initial Assessment and Tentative D/C    Date: 2020   Patient Name: Allison Jackson  MRN: [id number]     : 1933    Date of Service: 2020    Discharge Recommendations: Allison Jacksno scored a 19/24 on the AM-PAC ADL Inpatient form. Current research shows that an AM-PAC score of 18 or greater is typically associated with a discharge to the patient's home setting. Based on the patient's AM-PAC score, and their current ADL deficits, it is recommended that the patient have 2-3 sessions per week of Occupational Therapy at d/c to increase the patient's independence. At this time, this patient demonstrates the endurance and safety to discharge home with [facility] and a follow up treatment frequency of 2-3x/wk. Please see assessment section for further patient specific details. If patient discharges prior to next session this note will serve as a discharge summary. Please see below for the latest assessment towards goals. Continue to assess pending progress, 24 hour supervision or assist, 2-3 sessions per week  OT Equipment Recommendations  Other: continue to assess    Assessment   Performance deficits / Impairments: Decreased functional mobility ; Decreased ADL status; Decreased strength;Decreased endurance;Decreased balance;Decreased high-level IADLs  Assessment: Alilson Jackson is a 80 y.o. female who presents to the emergency department secondary to concern for shortness of breath. On arrival she is awake, alert, oriented. She states for the last 2 days she has had some shortness of breath with feelings of indigestion. She had toe surgery a few weeks ago at [facility] and was diagnosed with Covid on November or teens. She reports she has been doing well aside from feeling more short of breath the last few days. She is not on oxygen at home.  PTA pt from home with family since recent L toe surgery where she has needed assist for mobility and ADLs with use of Hospital and was diagnosed with Covid on November or teens. She reports she has been doing well aside from feeling more short of breath the last few days. She is not on oxygen at home. Family / Caregiver Present: No  Referring Practitioner: Micha Gutierrez MD  Subjective  Subjective: Pt supine in bed upon arrival and agreeable to OT evaluation. Pt reports no pain. Pt on RA throughout session. Pt reports recent L toe amputation and she is heel WB with use of off loading shoe. Pt reports she has been living with daughter since surgery. General Comment  Comments: okay for therapy per RN.   Pain Assessment  Pain Level: 0  Response to Pain Intervention: Asleep with RR greater than 10  Vital Signs  Temp: 98.1 °F (36.7 °C)  Temp Source: Oral  Pulse: 88  Heart Rate Source: Radial  Resp: 15  BP: (!) 148/80  BP Location: Left Arm  BP Upper/Lower: Upper  MAP (mmHg): 103  Oxygen Therapy  SpO2: 94 %  O2 Device: None (Room air)  Social/Functional History  Social/Functional History  Lives With: Daughter(prior to surgery living with son but now living with daughter due to better home layout; plan to return to daughters home; information is for daughters home; daughter and )  Type of Home: House  Home Layout: One level  Home Access: Stairs to enter without rails  Entrance Stairs - Number of Steps: 1  Bathroom Shower/Tub: Tub/Shower unit  Bathroom Toilet: Standard(with toilet raiser with rails)  Bathroom Equipment: Grab bars in shower, Shower chair  Home Equipment: Rolling walker  ADL Assistance: Independent(needing assist since her foot surgery)  Homemaking Assistance: Needs assistance(daughter completes)  Ambulation Assistance: Needs assistance(use of RW since foot surgery with assist from family)  Transfer Assistance: Needs assistance  Active : Yes       Objective   Vision: Impaired  Vision Exceptions: Wears glasses at all times(prosthetic R eye)  Hearing: Within functional limits    Orientation  Overall Orientation Status: Within Functional Limits  Orientation Level: Oriented X4  Observation/Palpation  Observation: L foot wrap  Balance  Sitting Balance: Stand by assistance  Standing Balance: Contact guard assistance(RW)  Functional Mobility  Functional - Mobility Device: Rolling Walker  Activity: Other; To/from bathroom(short household distances in room; ~30ft)  Assist Level: Contact guard assistance  Functional Mobility Comments: no overt LOB; pt with noted SOB ambulating in room although poor SpO2 reading; pt on RA; HR elevating to 150bpm during activity and ambulation  Toilet Transfers  Toilet - Technique: Ambulating(RW)  Equipment Used: Grab bars  Toilet Transfer: Minimal assistance  Wheelchair Bed Transfers  Wheelchair/Bed - Technique: Ambulating(RW)  Equipment Used: Bed;Other(bed to chair)  Level of Asssistance: Minimal assistance;Contact guard assistance  Wheelchair Transfers Comments: CGA from chair and Min from EOB  ADL  Feeding: Independent  LE Dressing: Minimal assistance(pt able to thread B LE into brief; pt able to manage over hips with CGA for balance; assist to don off loading shoe to L LE)  Additional Comments: Anticipate pt needing up to Min/Mod A for ADLs including dressing, bathing, and toileting based on ROM, strength, balance, and endurance.   Tone RUE  RUE Tone: Normotonic  Tone LUE  LUE Tone: Normotonic  Coordination  Movements Are Fluid And Coordinated: Yes     Bed mobility  Supine to Sit: Supervision  Sit to Supine: Unable to assess  Scooting: Supervision  Transfers  Sit to stand: Contact guard assistance;Minimal assistance  Stand to sit: Minimal assistance;Contact guard assistance  Transfer Comments: to/from RW; cues for hand placement     Cognition  Overall Cognitive Status: WFL                 LUE AROM (degrees)  LUE AROM : WFL  RUE AROM (degrees)  RUE AROM : WFL  LUE Strength  Gross LUE Strength: WFL  RUE Strength  Gross RUE Strength: WFL                   Plan   Plan  Times per week: 3-5x  Current

## 2020-11-27 NOTE — PROGRESS NOTES
Pulmonary Progress Note    Date of Admission: 11/25/2020   LOS: 2 days     Chief Complaint   Patient presents with    Shortness of Breath     for a couple days, COVID +, SaO2 89-90 % on RA       Assessment:     Acute focal pneumonia  COVID-19 pneumonia  CKD  Gout  Chronic steroids  Recent left second toe amputation on home antibiotics    Plan:      -Start remdesivir yesterday, LFTs today normal continue to trend  -She is on chronic steroids, we increased these to IV methyl Pred 40 mg while she is here in with Covid pneumonia.  -Her BNP is slightly trending up would be judicious with maintenance IV fluids  -She is on Merrem for her foot    24 Hour Events/Subjective  No acute events overnight. She remains on room air and asymptomatic. She has some mild pain in her toe. ROS:   No nausea  No Vomiting  No chest pain      Intake/Output Summary (Last 24 hours) at 11/27/2020 1604  Last data filed at 11/27/2020 1530  Gross per 24 hour   Intake 1090 ml   Output 250 ml   Net 840 ml         PHYSICAL EXAM:   Blood pressure (!) 142/75, pulse 98, temperature 96.7 °F (35.9 °C), temperature source Axillary, resp. rate 16, height 5' 4\" (1.626 m), weight 129 lb 10.1 oz (58.8 kg), SpO2 93 %.'  Gen:  No acute distress. Eyes: PERRL. Anicteric sclera. No conjunctival injection. ENT: No discharge. Posterior oropharynx clear. External appearance of ears and nose normal.  Neck: Trachea midline. No mass   Resp:  No crackles. No wheezes. No rhonchi. No dullness on percussion. CV: Regular rate. Regular rhythm. No murmur or rub. No edema. GI: Soft, Non-tender. Non-distended. +BS  Skin: Warm, dry, w/o erythema. Lymph: No cervical or supraclavicular LAD. M/S: No cyanosis. No clubbing. Neuro:  no focal neurologic deficit. Moves all extremities  Psych: Awake and alert, Oriented x 3. Judgement and insight appropriate. Mood stable.       Medications:    Scheduled Meds:   lactobacillus  1 capsule Oral BID WC    sodium chloride flush  10 mL Intravenous 2 times per day    methylPREDNISolone  40 mg Intravenous Daily    remdesivir IVPB  100 mg Intravenous Q24H    enoxaparin  30 mg Subcutaneous BID    aspirin  81 mg Oral Daily    atorvastatin  40 mg Oral Daily    brimonidine  1 drop Left Eye TID    clopidogrel  75 mg Oral Daily    colchicine  0.6 mg Oral Daily    gabapentin  100 mg Oral BID    latanoprost  1 drop Left Eye Nightly    Vitamin D  1,000 Units Oral Daily    meropenem  500 mg Intravenous Q8H    albuterol-ipratropium  1 puff Inhalation 4x daily       Continuous Infusions:      PRN Meds:  menthol-zinc oxide, sodium chloride flush, potassium chloride, magnesium sulfate, acetaminophen **OR** acetaminophen, magnesium hydroxide, promethazine **OR** ondansetron, sodium chloride    Labs reviewed:  CBC:   Recent Labs     11/25/20  1147 11/27/20  0440   WBC 5.1 6.1   HGB 11.8* 11.8*   HCT 35.7* 36.0   MCV 87.3 86.5    282     BMP:   Recent Labs     11/25/20  1147 11/26/20  1315 11/27/20  0440    143 142   K 3.7 4.2 3.8    105 105   CO2 27 27 25   BUN 8 8 14   CREATININE 0.8 0.8 0.9     LIVER PROFILE:   Recent Labs     11/25/20  1147 11/26/20  1315 11/27/20  0440   AST 28 21 17   ALT 26 21 19   LIPASE 23.0  --   --    BILIDIR <0.2  --  <0.2   BILITOT 0.3 0.4 <0.2   ALKPHOS 91 84 89     PT/INR:   Recent Labs     11/25/20  1147   PROTIME 12.6   INR 1.09     APTT: No results for input(s): APTT in the last 72 hours. UA:  Recent Labs     11/25/20  1609   COLORU YELLOW   PHUR 7.0   WBCUA 2   RBCUA 1   CLARITYU Clear   SPECGRAV 1.014   LEUKOCYTESUR Negative   UROBILINOGEN 0.2   BILIRUBINUR Negative   BLOODU Negative   GLUCOSEU Negative     No results for input(s): PH, PCO2, PO2 in the last 72 hours. Films:  Radiology Review:  Pertinent images / reports were reviewed as a part of this visit. CT Chest w/ contrast: No results found for this or any previous visit.     CT Chest w/o contrast:   Results for orders sampling. *  4. Calcific atherosclerosis aorta and coronary arteries. 5. Mild cardiomegaly. Peak  ______________________________________________________________________________  ____    *These guidelines do not apply to immunocompromised patients and patients  with cancer. Follow up in patients with significant comorbidities as  clinically warranted. For lung cancer screening, adhere to Lung-RADS  guidelines. Reference: Radiology. 2017; 284(1):228-43. CTPA: No results found for this or any previous visit. CXR PA/LAT: No results found for this or any previous visit. CXR portable:   Results for orders placed during the hospital encounter of 11/25/20   XR CHEST PORTABLE    Narrative EXAMINATION:  ONE XRAY VIEW OF THE CHEST    11/25/2020 11:20 am    COMPARISON:  January 11, 2007    HISTORY:  ORDERING SYSTEM PROVIDED HISTORY: SOB, PICC verification  TECHNOLOGIST PROVIDED HISTORY:  Reason for exam:->SOB, PICC verification  Reason for Exam: SOB, PICC verification  Acuity: Chronic  Type of Exam: Ongoing    FINDINGS:  Diffuse reticular opacity throughout both lungs is present. Right-sided PICC  line terminates in the lower SVC. No evidence of pneumothorax or pleural  effusion. Impression Bilateral pulmonary disease, new from 2007. This may be due to interstitial  disease, either acute interstitial edema or chronic interstitial disease. Diffuse infection can also have this appearance. This note was transcribed using [id number] [employer]. Please disregard any translational errors.     Thank you for this consult,    Lucille Franklin [address] [place] [address] Pulmonary, Critical Care, and Sleep Medicine

## 2020-11-27 NOTE — PLAN OF CARE
Discharge Planning:  Goal: Patients continuum of care needs are met  Description: Patients continuum of care needs are met  Outcome: Ongoing     Problem: Airway Clearance - Ineffective  Goal: Achieve or maintain patent airway  Outcome: Ongoing     Problem: Gas Exchange - Impaired  Goal: Absence of hypoxia  Outcome: Ongoing  Goal: Promote optimal lung function  Outcome: Ongoing     Problem: Breathing Pattern - Ineffective  Goal: Ability to achieve and maintain a regular respiratory rate  Outcome: Ongoing     Problem:  Body Temperature -  Risk of, Imbalanced  Goal: Ability to maintain a body temperature within defined limits  Outcome: Ongoing  Goal: Will regain or maintain usual level of consciousness  Outcome: Ongoing  Goal: Complications related to the disease process, condition or treatment will be avoided or minimized  Outcome: Ongoing     Problem: Isolation Precautions - Risk of Spread of Infection  Goal: Prevent transmission of infection  Outcome: Ongoing     Problem: Nutrition Deficits  Goal: Optimize nutrtional status  Outcome: Ongoing     Problem: Risk for Fluid Volume Deficit  Goal: Maintain normal heart rhythm  Outcome: Ongoing  Goal: Maintain absence of muscle cramping  Outcome: Ongoing  Goal: Maintain normal serum potassium, sodium, calcium, phosphorus, and pH  Outcome: Ongoing     Problem: Loneliness or Risk for Loneliness  Goal: Demonstrate positive use of time alone when socialization is not possible  Outcome: Ongoing     Problem: Patient Education: Go to Patient Education Activity  Goal: Patient/Family Education  Outcome: Ongoing     Problem: Fatigue  Goal: Verbalize increase energy and improved vitality  Outcome: Ongoing     Problem: Health Behavior:  Goal: Ability to identify and utilize available resources and services will improve  Description: Ability to identify and utilize available resources and services will improve  Outcome: Ongoing     Problem: Role Relationship:  Goal: Ability to verbalize awareness of feelings that lead to decreased social interaction will improve  Description: Ability to verbalize awareness of feelings that lead to decreased social interaction will improve  Outcome: Ongoing  Goal: Ability to demonstrate behaviors to correct the problem will improve  Description: Ability to demonstrate behaviors to correct the problem will improve  Outcome: Ongoing  Goal: Ability to interact with others will improve  Description: Ability to interact with others will improve  Outcome: Ongoing     Problem: Nutrition Deficit:  Goal: Ability to achieve adequate nutritional intake will improve  Description: Ability to achieve adequate nutritional intake will improve  Outcome: Ongoing     Problem: Pain:  Goal: Pain level will decrease  Description: Pain level will decrease  Outcome: Ongoing  Goal: Control of acute pain  Description: Control of acute pain  Outcome: Ongoing  Goal: Control of chronic pain  Description: Control of chronic pain  Outcome: Ongoing  Goal: Patient's pain/discomfort is manageable  Description: Patient's pain/discomfort is manageable  Outcome: Ongoing

## 2020-11-27 NOTE — PROGRESS NOTES
Hospitalist Progress Note      PCP: Campos Wade MD    Date of Admission: 11/25/2020    Chief Complaint: Shortness of breath    Hospital Course: 49-year-old female with past medical history of CKD, gout, hyperlipidemia and recent left second toe amputation at [facility] who presents to the hospital today for trouble breathing. Subjective: No acute events reported overnight. Patient feels slightly tired and lethargic today but otherwise doing okay. Breathing is stable she says      Medications:  Reviewed    Infusion Medications   Scheduled Medications    lactobacillus  1 capsule Oral BID WC    sodium chloride flush  10 mL Intravenous 2 times per day    methylPREDNISolone  40 mg Intravenous Daily    remdesivir IVPB  100 mg Intravenous Q24H    enoxaparin  30 mg Subcutaneous BID    aspirin  81 mg Oral Daily    atorvastatin  40 mg Oral Daily    brimonidine  1 drop Left Eye TID    clopidogrel  75 mg Oral Daily    colchicine  0.6 mg Oral Daily    gabapentin  100 mg Oral BID    latanoprost  1 drop Left Eye Nightly    Vitamin D  1,000 Units Oral Daily    meropenem  500 mg Intravenous Q8H    albuterol-ipratropium  1 puff Inhalation 4x daily     PRN Meds: menthol-zinc oxide, sodium chloride flush, potassium chloride, magnesium sulfate, acetaminophen **OR** acetaminophen, magnesium hydroxide, promethazine **OR** ondansetron, sodium chloride      Intake/Output Summary (Last 24 hours) at 11/27/2020 1258  Last data filed at 11/27/2020 1234  Gross per 24 hour   Intake 1090 ml   Output --   Net 1090 ml       Physical Exam Performed:    /71   Pulse 90   Temp 97.5 °F (36.4 °C) (Oral)   Resp 15   Ht 5' 4\" (1.626 m)   Wt 129 lb 10.1 oz (58.8 kg)   SpO2 95%   BMI 22.25 kg/m²     General appearance: No apparent distress, appears stated age and cooperative. HEENT: Pupils equal, round, and reactive to light. Conjunctivae/corneas clear. Neck: Supple, with full range of motion.  No jugular venous distention. Trachea midline. Respiratory:  Normal respiratory effort. Clear to auscultation, bilaterally without Rales/Wheezes/Rhonchi. Cardiovascular: Regular rate and rhythm with normal S1/S2 without murmurs, rubs or gallops. Abdomen: Soft, non-tender, non-distended with normal bowel sounds. Musculoskeletal: No clubbing, cyanosis or edema bilaterally. Full range of motion without deformity. Left foot wrapped in bandage with some drainage noted  Skin: Skin color, texture, turgor normal.  No rashes or lesions. Neurologic:  Neurovascularly intact without any focal sensory/motor deficits. Cranial nerves: II-XII intact, grossly non-focal.  Psychiatric: Alert and oriented, thought content appropriate, normal insight  Capillary Refill: Brisk,< 3 seconds   Peripheral Pulses: +2 palpable, equal bilaterally       Labs:   Recent Labs     11/25/20  1147 11/27/20  0440   WBC 5.1 6.1   HGB 11.8* 11.8*   HCT 35.7* 36.0    282     Recent Labs     11/25/20  1147 11/26/20  1315 11/27/20  0440    143 142   K 3.7 4.2 3.8    105 105   CO2 27 27 25   BUN 8 8 14   CREATININE 0.8 0.8 0.9   CALCIUM 9.2 8.7 8.6     Recent Labs     11/25/20  1147 11/26/20  1315 11/27/20  0440   AST 28 21 17   ALT 26 21 19   BILIDIR <0.2  --  <0.2   BILITOT 0.3 0.4 <0.2   ALKPHOS 91 84 89     Recent Labs     11/25/20  1147   INR 1.09     Recent Labs     11/25/20  1609 11/25/20  1933 11/25/20  2356   TROPONINI 0.02* 0.02* 0.02*       Urinalysis:      Lab Results   Component Value Date    NITRU Negative 11/25/2020    WBCUA 2 11/25/2020    RBCUA 1 11/25/2020    BLOODU Negative 11/25/2020    SPECGRAV 1.014 11/25/2020    GLUCOSEU Negative 11/25/2020       Radiology:  CT CHEST WO CONTRAST   Final Result   1. Moderately severe pulmonary involvement with infiltrates typical of   COVID-19.   2. Small volume bilateral pleural effusion. 3. 14.0 mm solid pulmonary nodule left pleural region.   Consider a   non-contrast Chest CT at 3 months, a PET/CT, or tissue sampling. *   4. Calcific atherosclerosis aorta and coronary arteries. 5. Mild cardiomegaly. Peak   ______________________________________________________________________________   ____      *These guidelines do not apply to immunocompromised patients and patients   with cancer. Follow up in patients with significant comorbidities as   clinically warranted. For lung cancer screening, adhere to Lung-RADS   guidelines. Reference: Radiology. 2017; 284(1):228-43. XR FOOT LEFT (MIN 3 VIEWS)   Final Result   1. Overlying artifacts degrades study quality. 2. Nonspecific soft tissue edema may be postsurgical or related to   cellulitis, centered about the 2nd ray surgical site and hallux. 3. Osteoarthritis. 4. Hallux valgus deformity. 5. No definite acute osseous abnormality evident. XR CHEST PORTABLE   Final Result   Bilateral pulmonary disease, new from 2007. This may be due to interstitial   disease, either acute interstitial edema or chronic interstitial disease. Diffuse infection can also have this appearance. Assessment/Plan:    Shortness of breath secondary to COVID-19 pneumonia  -On Solu-Medrol and remdesivir as per pulmonology recommendation  -Continue monitor symptoms for any worsening    Left toe infection status post amputation  -Continue IV ertapenem. Wound care consulted    Hypertension  -Continue ertapenem, lisinopril, hydrochlorothiazide    Hyperlipidemia  -Continue statin    Gout  -Continue colchicine    DVT Prophylaxis: Lovenox  Diet: DIET CARB CONTROL; Dietary Nutrition Supplements: Low Calorie High Protein Supplement  Code Status: Full Code    PT/OT Eval Status: Consulted    Dispo -continue remdesivir.   Continue to monitor symptoms    Milady Bhandari MD

## 2020-11-27 NOTE — PROGRESS NOTES
Pt arrived to 4278 from 5N. Pt A/O x4. Vitals are stable. Oriented Pt to room and call light. Call light within reach. Bed in lowest position with wheels locked. Bed alarm on. Pt on specialty mattress. Tele monitor on Pt. Pt in droplet plus isolation. Will monitor.

## 2020-11-27 NOTE — CONSULTS
Comprehensive Nutrition Assessment    Type and Reason for Visit:  Consult, Positive Nutrition Screen(MST4/ Nutritional Assessment for Moreno Score)    Nutrition Recommendations/Plan:   Continue Carb control diet. Start Ensure HP BID. Nutrition Assessment:  Consult for Moreno score and + malnutrition score4. Pt presented with SOB, COVID+ and in isolation. Pt with recent toe amputation. Pt with increased nutrition needs r/t multiple skin issues. Noted pt with good PO intakes >75%. Unable to assess wt loss d/t limited wt hx. Noted wt 2 years ago around 145# now 129#. Will start Ensure HP for higher protein needs to aid in wound healing. Malnutrition Assessment:  Malnutrition Status:  Insufficient data      Estimated Daily Nutrient Needs:  Energy (kcal):  2320-3442 kcal (25-30 kcal/kg CBW)  Protein (g):  70-81 gm (1.2-1.4gm/kg CBW)       Fluid (ml/day):  1 mL/kcal      Nutrition Related Findings:  +1 BLE edema; BM 11/27      Wounds:  Multiple, Stage II, Stage III, Pressure Injury(stg 3 coccyx, stg 2 buttock, multiple other coccyx wounds not classified, see LDA)       Current Nutrition Therapies:    DIET CARB CONTROL;     Anthropometric Measures:  · Height: 5' 4\" (162.6 cm)  · Current Body Weight: 129 lb (58.5 kg)   · Ideal Body Weight: 120 lbs; % Ideal Body Weight 107.5 %   · BMI: 22.1  · BMI Categories: Normal Weight (BMI 22.0 to 24.9) age over 72       Nutrition Diagnosis:   · Increased nutrient needs related to increase demand for energy/nutrients as evidenced by wounds      Nutrition Interventions:   Food and/or Nutrient Delivery:  Continue Current Diet, Start Oral Nutrition Supplement  Nutrition Education/Counseling:  No recommendation at this time   Coordination of Nutrition Care:  Continue to monitor while inpatient    Goals:  consume >50% of meals and supplement during admission       Nutrition Monitoring and Evaluation:   Food/Nutrient Intake Outcomes:  Food and Nutrient Intake, Supplement Intake  Physical Signs/Symptoms Outcomes:  Biochemical Data, Skin, Weight, Nutrition Focused Physical Findings     Discharge Planning:     Too soon to determine     Electronically signed by John Walden RD, LD on 11/27/20 at 11:29 AM EST    Contact: [phone]

## 2020-11-27 NOTE — CARE COORDINATION
SW attempted to contact patient to complete ACP and initial assessment. Patient unable to answer at this time. SW/CM will follow up with patient to assess for needs and explain CM role. Electronically signed by MILTON Smith LSW on 11/27/2020 at 11:47 AM     INITIAL CASE MANAGEMENT ASSESSMENT    Reviewed chart, unable to meet with patient due to isolation status. Called patient's daughter, per RN's report, spoke with patient's daughter over the phone to assess possible discharge needs. Explained Case Management role/services. Living Situation: Patient lives with her daughter, and son-in-law in a house; 2 steps to enter. Patient's daughter confirmed patient's home address. SW updated patient's face sheet. ADLs: Independent prior to admission. DME: [employer]    PT/OT Recs: 19/24 AM-PAC. PTA pt living with dtr/s-i-l  in home with 1 step to enter and 1st floor bed/bath. Family assist with daily care and mobility since recent foot surg. Pt reports plan to return to dtr's home with adeuqate assist/support. Will recommmend Home PT. Will monitor pt's progress. Active Services: [facility]     Transportation: Family to transport at discharge. [facility]ations[facility] [employer]    PCP: Fozia Anne MD      HD/PD: N/A    PLAN/COMMENTS: Patient's daughter expressed concerns with patient's discharge plan as she and her  have tested positive for COVID. SW encouraged daughter to discuss the concerns with patient returning to a home with positive cases with patient's nurse. SW reviewed patient's PT/OT score with daughter. ACP completed with patient. CM provided contact information for patient or family to call with any questions. CM will follow and assist as needed.     Electronically signed by MILTON Smith LSW on 11/27/2020 at 3:38 PM

## 2020-11-27 NOTE — PROGRESS NOTES
Physical Therapy    Facility/Department: [address] PROGRESSIVE CARE  Initial Assessment    NAME: Laxmi Mathew  : 1933  MRN: [id number]    Date of Service: 2020    Discharge Recommendations:  Continue to assess pending progress   PT Equipment Recommendations  Other: Will monitor for potential equipt needs. Laxmi Mathew scored a 18/24 on the AM-PAC short mobility form. Current research shows that an AM-PAC score of 18 or greater is typically associated with a discharge to the patient's home setting. Based on the patient's AM-PAC score and their current functional mobility deficits, it is recommended that the patient have 2-3 sessions per week of Physical Therapy at d/c to increase the patient's independence. At this time, this patient demonstrates the endurance and safety to discharge home with Home PT Evaluation and a follow up treatment frequency of 2-3x/wk. Please see assessment section for further patient specific details. If patient discharges prior to next session this note will serve as a discharge summary. Please see below for the latest assessment towards goals. Assessment   Body structures, Functions, Activity limitations: Decreased functional mobility ; Decreased endurance  Assessment: 81 y/o female admit 2020 with Recent L Toe Amp, Ongoing SOB, COVID +. PMH as noted including CKD, Gout, L Foot Surg (Toe Amp, [facility], \"few wks ago\"). PTA pt living with dtr/s-i-l  in home with 1 step to enter and 1st floor bed/bath. Family assist with daily care and mobility since recent foot surg. Pt reports plan to return to dtr's home with adeuqate assist/support. Will recommmend Home PT. Will monitor pt's progress. Prognosis: Good  Decision Making: Medium Complexity  History: 81 y/o female admit 2020 with Recent L Toe Amp, Ongoing SOB, COVID +. PMH as noted including CKD, Gout, L Foot Surg (Toe Amp, [facility], \"few wks ago\"). Exam: See above.   Clinical Presentation: See above. Patient Education: Role of PT, POC, Need to call for assist, Safe use of Walker. Barriers to Learning: None. REQUIRES PT FOLLOW UP: Yes  Activity Tolerance  Activity Tolerance: Patient limited by endurance       Patient Diagnosis(es): The primary encounter diagnosis was Shortness of breath. Diagnoses of COVID-19 and Amputated toe of left foot ([address] were also pertinent to this visit. has a past medical history of Chronic kidney disease, Gout, Hyperlipidemia, and Hypertension. has a past surgical history that includes Appendectomy; eye surgery (2004); and Foot surgery. Restrictions  Restrictions/Precautions  Restrictions/Precautions: Contact Precautions, Weight Bearing  Lower Extremity Weight Bearing Restrictions  Partial Weight Bearing Percentage Or Pounds: H/O recent L Toe Amp. Per chart review pt heel WB with off loading shoe; R LE WBAT  Position Activity Restriction  Other position/activity restrictions: Droplet Plus : COVID +. Vision/Hearing  Vision: Impaired  Vision Exceptions: Wears glasses at all times(Prosthetic R Eye.)  Hearing: Within functional limits     Subjective  General  Chart Reviewed: Yes  Patient assessed for rehabilitation services?: Yes  Additional Pertinent Hx: 79 y/o female amdit 11/25/2020 with Recent L Toe Amp, Ongoing SOB, COVID +. PMH as noted including CKD, Gout, L Foot Surg (Toe Amp, [facility], \"few wks ago\"). Family / Caregiver Present: No  Referring Practitioner: Dr. Jhon Tom. Diagnosis: Recent L Toe Amp, Ongoing SOB, COVID +. Follows Commands: Within Functional Limits  Subjective  Subjective: Pt agreeable to PT Eval/Rx. Pain Screening  Patient Currently in Pain: No          Orientation  Orientation  Overall Orientation Status: Within Functional Limits  Social/Functional History  Social/Functional History  Lives With: Daughter(Prior to surgery living with son but now living with daughter due to better home layout.    Information pertains to dtr's (and )  home.)  Type of Home: House  Home Layout: One level  Home Access: Stairs to enter without rails  Entrance Stairs - Number of Steps: 1  Bathroom Shower/Tub: Tub/Shower unit  Bathroom Toilet: Standard(Toilet raiser with rails)  Bathroom Equipment: Grab bars in shower, Shower chair  Home Equipment: Rolling walker  ADL Assistance: Independent(Assist since recent foot surg.)  [address]: Needs assistance(Dtr takes care of homemaking needs.)  Ambulation Assistance: Needs assistance(With Rolling Walker and assist from family since recent foot surg.)  Transfer Assistance: Needs assistance  Active : Yes  Cognition   Cognition  Overall Cognitive Status: WFL    Objective     Observation/Palpation  Observation: Dressing L Foot. AROM RLE (degrees)  RLE AROM: WFL  AROM LLE (degrees)  LLE AROM : WFL  AROM RUE (degrees)  RUE AROM : WFL  AROM LUE (degrees)  LUE AROM : WFL  Strength RLE  Strength RLE: WFL  Strength LLE  Strength LLE: WFL  Strength RUE  Strength RUE: WFL  Strength LUE  Strength LUE: WFL        Bed mobility  Supine to Sit: Supervision  Transfers  Sit to Stand: Contact guard assistance  Stand to sit: Contact guard assistance  Ambulation  Ambulation?: Yes  Ambulation 1  Device: Rolling Walker  Distance: Pt amb ~20' with Rolling Walker Min assist.  L LE off loading shoe. Diminished step length/clearance. Comments: RA with sats remain at least 90%. HR briefly 140's. Plan   Plan  Times per week: 3-5x week while in acute care setting.   Current Treatment Recommendations: Functional Mobility Training, Transfer Training, Gait Training, Safety Education & Training, Patient/Caregiver Education & Training  Safety Devices  Type of devices: Call light within reach, Chair alarm in place, Left in chair, Nurse notified          AM-PAC Score  AM-PAC Inpatient Mobility Raw Score : 18 (11/27/20 1229)  AM-PAC Inpatient T-Scale Score : 43.63 (11/27/20 1229)  Mobility Inpatient CMS 0-100% Score: 46.58 (11/27/20 1229)  Mobility Inpatient CMS G-Code Modifier : CK (11/27/20 1229)          Goals  Short term goals  Time Frame for Short term goals: Upon d/c acute care setting. Short term goal 1: Bed Mob Independent. Short term goal 2: Transfers with assist device SBA. Short term goal 3: Amb with assist device 50 SBA. Patient Goals   Patient goals : Return home with family.        Therapy Time   Individual Concurrent Group Co-treatment   Time In [facility] 38         Time Out 1130         Minutes 0 [facility] Waldo Lacey

## 2020-11-27 NOTE — PROGRESS NOTES
Patient's urine output 250 ml today. PVR zero. MD Ramirez notified. Awaiting for response. BM x 1. Patient transferred to room 4278 per order. Bedside report to Velia Robles RN.  Electronically signed by Leila Malone RN on 11/27/2020 at 6:04 PM

## 2020-11-27 NOTE — PROGRESS NOTES
Patient noted with minimum urine output. Bladder scan x 2 with first reading a zero and the second reading in one hour 19 ml. Patient denies pain or pressure and says it has never happened to her before. Labs reviewed this morning. VS stable. PO intake 240 ml of orange juice this morning. MD Ramirez notified; no new orders. Patient is up in chair with fall prec in place; call-light within reach. Cont monitoring.  Electronically signed by Mayo Christopher RN on 11/27/2020 at 10:58 AM

## 2020-11-27 NOTE — PROGRESS NOTES
Physician Progress Note      PATIENTKathryn Ahumada  [facility] #:                  [id number]  :                       1933  ADMIT DATE:       2020 10:39 AM  DISCH DATE:  RESPONDING  PROVIDER #:        Vladimir Yeboah MD          QUERY TEXT:    Dear Dr Lilli Mcknight,    Patient admitted with COVID pneumonia. Noted documentation of acute   respiratory failure in h&p. Please indicate one of the following and document   in the medical record: The medical record reflects the following:  Risk Factors: COVID, pneumonia  Clinical Indicators: On admission c/o sob, SaO2 87-88% while standing, not on   oxygen at home. Pulmonary effort is normal. No respiratory distress. Per ED rr   24, 90%ra  no O2 applied. Per h&p-Respiratory:  Normal respiratory effort. Clear to auscultation, bilaterally without Rales/Wheezes/Rhonchi, Acute   hypoxic respiratory failure satting less than 90% on room air. Per pn   Shortness of breath secondary to COVID-19 pneumonia Patient is not hypoxic so   she was not started on Decadron/remdesivir  Treatment: monitor resp status, monitor o2 sats,  no o2 was applied    Thank you, [facility] Work RN CDS [email or web address]  [phone]    Acute Respiratory Failure Clinical Indicators per [employer] Training Guide and   Quick Reference Guide:  pO2 < 60 mmHg or SpO2 (pulse oximetry) < 91% breathing room air  pCO2 > 50 and pH < 7.35  P/F ratio (pO2 / FIO2) < 300  pO2 decrease or pCO2 increase by 10 mmHg from baseline (if known)  Supplemental oxygen of 40% or more  Presence of respiratory distress, tachypnea, dyspnea, shortness of breath,   wheezing  Unable to speak in complete sentences  Use of accessory muscles to breathe  Extreme anxiety and feeling of impending doom  Tripod position  Confusion/altered mental status/obtunded  Options provided:  -- Acute Respiratory Failure ruled out after study  -- Acute Respiratory Failure currently as evidenced by, Please document   evidence.   -- Currently

## 2020-11-27 NOTE — PROGRESS NOTES
Report given to room 201 [facility]; all questions answered. Awaiting for the room to be cleaned.  Electronically signed by Marcia Max RN on 11/27/2020 at 3:41 PM

## 2020-11-27 NOTE — PROGRESS NOTES
Patient noted with enriquez like green BM x 1 today. No mucus or abnormal odor noted. From previous shift report, patient had same consistency BM x 4 overnight. Patient confirms. MD Ramirez notified, new orders in place.  Electronically signed by Mena Yee RN on 11/27/2020 at 11:33 AM

## 2020-11-27 NOTE — PROGRESS NOTES
Patient's daughter Leavy Kussmaul notified of room change and updated on COVID related POC and discharge planning. Denies questions or concerns at this time.  Electronically signed by Criss Childs RN on 11/27/2020 at 6:08 PM

## 2020-11-27 NOTE — PROGRESS NOTES
Voice message left to podiatry MD Twylla Nissen with an updated room number. Podiatry MD Twylla Nissen consulted for s/p left foot second digit amp orders.  Electronically signed by Klaus Castillo RN on 11/27/2020 at 6:15 PM

## 2020-11-27 NOTE — CARE COORDINATION
Wound care consulted for skin breakdown on buttock area. Pt currently in [facility] isolation. Per documentation pt having frequent BM's. Specialty bed already ordered. Would recommend calmoseptine. Avoid briefs. Continue with prevention measures. Will attempt to see pt at a later time.   -moisture barrier to buttocks (neris)  -sacral border to sacral area, peel back each shift to assess skin, change every 3 days and as needed  -turn and reposition every 2 hours  -chair cushion, encourage to reposition self frequently while in chair  -elevate heels, apply liquid barrier film twice daily  Electronically signed by Shan Jimenez RN on 11/27/2020 at 12:59 PM

## 2020-11-28 VITALS
HEART RATE: 95 BPM | OXYGEN SATURATION: 95 % | SYSTOLIC BLOOD PRESSURE: 158 MMHG | TEMPERATURE: 98.7 F | BODY MASS INDEX: 21.08 KG/M2 | HEIGHT: 64 IN | DIASTOLIC BLOOD PRESSURE: 72 MMHG | RESPIRATION RATE: 16 BRPM | WEIGHT: 123.46 LBS

## 2020-11-28 LAB
ALBUMIN SERPL-MCNC: 2.2 G/DL (ref 3.4–5)
ALP BLD-CCNC: 92 U/L (ref 40–129)
ALT SERPL-CCNC: 16 U/L (ref 10–40)
ANION GAP SERPL CALCULATED.3IONS-SCNC: 9 MMOL/L (ref 3–16)
AST SERPL-CCNC: 15 U/L (ref 15–37)
BILIRUB SERPL-MCNC: <0.2 MG/DL (ref 0–1)
BILIRUBIN DIRECT: <0.2 MG/DL (ref 0–0.3)
BILIRUBIN, INDIRECT: ABNORMAL MG/DL (ref 0–1)
BUN BLDV-MCNC: 22 MG/DL (ref 7–20)
CALCIUM SERPL-MCNC: 8.6 MG/DL (ref 8.3–10.6)
CHLORIDE BLD-SCNC: 103 MMOL/L (ref 99–110)
CO2: 27 MMOL/L (ref 21–32)
CREAT SERPL-MCNC: 0.8 MG/DL (ref 0.6–1.2)
GFR AFRICAN AMERICAN: >60
GFR NON-AFRICAN AMERICAN: >60
GLUCOSE BLD-MCNC: 137 MG/DL (ref 70–99)
GLUCOSE BLD-MCNC: 168 MG/DL (ref 70–99)
GLUCOSE BLD-MCNC: 248 MG/DL (ref 70–99)
GLUCOSE BLD-MCNC: 275 MG/DL (ref 70–99)
HCT VFR BLD CALC: 26.8 % (ref 36–48)
HEMOGLOBIN: 8.9 G/DL (ref 12–16)
MCH RBC QN AUTO: 28.6 PG (ref 26–34)
MCHC RBC AUTO-ENTMCNC: 33.2 G/DL (ref 31–36)
MCV RBC AUTO: 86.3 FL (ref 80–100)
PDW BLD-RTO: 18.1 % (ref 12.4–15.4)
PERFORMED ON: ABNORMAL
PLATELET # BLD: 426 K/UL (ref 135–450)
PMV BLD AUTO: 7.9 FL (ref 5–10.5)
POTASSIUM REFLEX MAGNESIUM: 3.9 MMOL/L (ref 3.5–5.1)
RBC # BLD: 3.11 M/UL (ref 4–5.2)
SODIUM BLD-SCNC: 139 MMOL/L (ref 136–145)
TOTAL PROTEIN: 4.8 G/DL (ref 6.4–8.2)
WBC # BLD: 10.3 K/UL (ref 4–11)

## 2020-11-28 PROCEDURE — 6360000002 HC RX W HCPCS: Performed by: INTERNAL MEDICINE

## 2020-11-28 PROCEDURE — 6370000000 HC RX 637 (ALT 250 FOR IP): Performed by: INTERNAL MEDICINE

## 2020-11-28 PROCEDURE — 80076 HEPATIC FUNCTION PANEL: CPT

## 2020-11-28 PROCEDURE — 80048 BASIC METABOLIC PNL TOTAL CA: CPT

## 2020-11-28 PROCEDURE — 2500000003 HC RX 250 WO HCPCS: Performed by: INTERNAL MEDICINE

## 2020-11-28 PROCEDURE — 85027 COMPLETE CBC AUTOMATED: CPT

## 2020-11-28 PROCEDURE — 2580000003 HC RX 258: Performed by: INTERNAL MEDICINE

## 2020-11-28 PROCEDURE — 94761 N-INVAS EAR/PLS OXIMETRY MLT: CPT

## 2020-11-28 PROCEDURE — 6370000000 HC RX 637 (ALT 250 FOR IP): Performed by: NURSE PRACTITIONER

## 2020-11-28 PROCEDURE — 94640 AIRWAY INHALATION TREATMENT: CPT

## 2020-11-28 RX ADMIN — Medication 1 CAPSULE: at 09:22

## 2020-11-28 RX ADMIN — INSULIN LISPRO 2 UNITS: 100 INJECTION, SOLUTION INTRAVENOUS; SUBCUTANEOUS at 12:30

## 2020-11-28 RX ADMIN — ATORVASTATIN CALCIUM 40 MG: 40 TABLET, FILM COATED ORAL at 09:22

## 2020-11-28 RX ADMIN — BRIMONIDINE TARTRATE 1 DROP: 2 SOLUTION OPHTHALMIC at 09:22

## 2020-11-28 RX ADMIN — REMDESIVIR 100 MG: 100 INJECTION, POWDER, LYOPHILIZED, FOR SOLUTION INTRAVENOUS at 16:31

## 2020-11-28 RX ADMIN — MEROPENEM 500 MG: 500 INJECTION, POWDER, FOR SOLUTION INTRAVENOUS at 02:27

## 2020-11-28 RX ADMIN — Medication 10 ML: at 09:26

## 2020-11-28 RX ADMIN — COLCHICINE 0.6 MG: 0.6 TABLET, FILM COATED ORAL at 09:21

## 2020-11-28 RX ADMIN — Medication 1 CAPSULE: at 16:31

## 2020-11-28 RX ADMIN — Medication 1000 UNITS: at 09:22

## 2020-11-28 RX ADMIN — CLOPIDOGREL BISULFATE 75 MG: 75 TABLET ORAL at 09:22

## 2020-11-28 RX ADMIN — ASPIRIN 81 MG: 81 TABLET, FILM COATED ORAL at 09:21

## 2020-11-28 RX ADMIN — BRIMONIDINE TARTRATE 1 DROP: 2 SOLUTION OPHTHALMIC at 16:33

## 2020-11-28 RX ADMIN — IPRATROPIUM BROMIDE AND ALBUTEROL 1 PUFF: 20; 100 SPRAY, METERED RESPIRATORY (INHALATION) at 16:21

## 2020-11-28 RX ADMIN — ENOXAPARIN SODIUM 30 MG: 30 INJECTION SUBCUTANEOUS at 09:22

## 2020-11-28 RX ADMIN — METHYLPREDNISOLONE SODIUM SUCCINATE 40 MG: 40 INJECTION, POWDER, FOR SOLUTION INTRAMUSCULAR; INTRAVENOUS at 09:22

## 2020-11-28 RX ADMIN — IPRATROPIUM BROMIDE AND ALBUTEROL 1 PUFF: 20; 100 SPRAY, METERED RESPIRATORY (INHALATION) at 09:03

## 2020-11-28 RX ADMIN — MEROPENEM 500 MG: 500 INJECTION, POWDER, FOR SOLUTION INTRAVENOUS at 09:30

## 2020-11-28 RX ADMIN — MEROPENEM 500 MG: 500 INJECTION, POWDER, FOR SOLUTION INTRAVENOUS at 17:38

## 2020-11-28 RX ADMIN — IPRATROPIUM BROMIDE AND ALBUTEROL 1 PUFF: 20; 100 SPRAY, METERED RESPIRATORY (INHALATION) at 12:04

## 2020-11-28 RX ADMIN — GABAPENTIN 100 MG: 100 CAPSULE ORAL at 09:22

## 2020-11-28 ASSESSMENT — PAIN DESCRIPTION - ORIENTATION: ORIENTATION: RIGHT

## 2020-11-28 ASSESSMENT — PAIN DESCRIPTION - FREQUENCY: FREQUENCY: CONTINUOUS

## 2020-11-28 ASSESSMENT — PAIN DESCRIPTION - LOCATION: LOCATION: FOOT

## 2020-11-28 ASSESSMENT — PAIN DESCRIPTION - DESCRIPTORS: DESCRIPTORS: CONSTANT;DULL

## 2020-11-28 ASSESSMENT — PAIN SCALES - GENERAL
PAINLEVEL_OUTOF10: 0
PAINLEVEL_OUTOF10: 6
PAINLEVEL_OUTOF10: 0

## 2020-11-28 NOTE — CARE COORDINATION
SW spoke with pts dtr who reports pt is able to go home today. She is requesting medical transport. [address] transport scheduled for [phone] today to pt home. Dtr will be there to receive pt at [place]. Pt is active with [facility] services. Message sent to MD to restart PT/OT and RN at [place].     Electronically signed by [email or web address] RAI Gomez on 11/28/2020 at 12:17 PM

## 2020-11-28 NOTE — PROGRESS NOTES
Patient has discharge to home orders. Patient notified of order. States she doesn't know if she can go home because her family is also sick with covid and may not be able to take care of her. Call placed to daughter, went to voicemail. Will attempt call later to review discharge plan.

## 2020-11-28 NOTE — PLAN OF CARE
Problem: Falls - Risk of:  Goal: Will remain free from falls  Description: Will remain free from falls  Outcome: Ongoing  Goal: Absence of physical injury  Description: Absence of physical injury  Outcome: Ongoing     Problem: Pain:  Goal: Pain level will decrease  Description: Pain level will decrease  Outcome: Ongoing  Goal: Control of acute pain  Description: Control of acute pain  Outcome: Ongoing  Goal: Control of chronic pain  Description: Control of chronic pain  Outcome: Ongoing  Goal: Patient's pain/discomfort is manageable  Description: Patient's pain/discomfort is manageable  Outcome: Ongoing     Problem: Skin Integrity:  Goal: Will show no infection signs and symptoms  Description: Will show no infection signs and symptoms  Outcome: Ongoing  Goal: Absence of new skin breakdown  Description: Absence of new skin breakdown  Outcome: Ongoing     Problem: Coping:  Goal: Expressions of feelings of enhanced comfort will increase  Description: Expressions of feelings of enhanced comfort will increase  Outcome: Ongoing     Problem: Urinary Elimination:  Goal: Ability to achieve a balanced intake and output will improve  Description: Ability to achieve a balanced intake and output will improve  Outcome: Ongoing  Goal: Ability to recognize the need to void and respond appropriately will improve  Description: Ability to recognize the need to void and respond appropriately will improve  Outcome: Ongoing  Goal: Will remain free from infection  Description: Will remain free from infection  Outcome: Ongoing     Problem: Infection:  Goal: Will remain free from infection  Description: Will remain free from infection  Outcome: Ongoing     Problem: Safety:  Goal: Free from accidental physical injury  Description: Free from accidental physical injury  Outcome: Ongoing  Goal: Free from intentional harm  Description: Free from intentional harm  Outcome: Ongoing     Problem: Daily Care:  Goal: Daily care needs are met  Description: Daily care needs are met  Outcome: Ongoing     Problem: Skin Integrity:  Goal: Skin integrity will stabilize  Description: Skin integrity will stabilize  Outcome: Ongoing     Problem: Discharge Planning:  Goal: Patients continuum of care needs are met  Description: Patients continuum of care needs are met  Outcome: Ongoing     Problem: Airway Clearance - Ineffective  Goal: Achieve or maintain patent airway  Outcome: Ongoing     Problem: Gas Exchange - Impaired  Goal: Absence of hypoxia  Outcome: Ongoing  Goal: Promote optimal lung function  Outcome: Ongoing     Problem: Breathing Pattern - Ineffective  Goal: Ability to achieve and maintain a regular respiratory rate  Outcome: Ongoing     Problem:  Body Temperature -  Risk of, Imbalanced  Goal: Ability to maintain a body temperature within defined limits  Outcome: Ongoing  Goal: Will regain or maintain usual level of consciousness  Outcome: Ongoing  Goal: Complications related to the disease process, condition or treatment will be avoided or minimized  Outcome: Ongoing     Problem: Isolation Precautions - Risk of Spread of Infection  Goal: Prevent transmission of infection  Outcome: Ongoing     Problem: Nutrition Deficits  Goal: Optimize nutrtional status  Outcome: Ongoing     Problem: Risk for Fluid Volume Deficit  Goal: Maintain normal heart rhythm  Outcome: Ongoing  Goal: Maintain absence of muscle cramping  Outcome: Ongoing  Goal: Maintain normal serum potassium, sodium, calcium, phosphorus, and pH  Outcome: Ongoing     Problem: Loneliness or Risk for Loneliness  Goal: Demonstrate positive use of time alone when socialization is not possible  Outcome: Ongoing     Problem: Fatigue  Goal: Verbalize increase energy and improved vitality  Outcome: Ongoing     Problem: Patient Education: Go to Patient Education Activity  Goal: Patient/Family Education  Outcome: Ongoing     Problem: Health Behavior:  Goal: Ability to identify and utilize available resources and services will improve  Description: Ability to identify and utilize available resources and services will improve  Outcome: Ongoing     Problem: Role Relationship:  Goal: Ability to verbalize awareness of feelings that lead to decreased social interaction will improve  Description: Ability to verbalize awareness of feelings that lead to decreased social interaction will improve  Outcome: Ongoing  Goal: Ability to demonstrate behaviors to correct the problem will improve  Description: Ability to demonstrate behaviors to correct the problem will improve  Outcome: Ongoing  Goal: Ability to interact with others will improve  Description: Ability to interact with others will improve  Outcome: Ongoing     Problem: Nutrition Deficit:  Goal: Ability to achieve adequate nutritional intake will improve  Description: Ability to achieve adequate nutritional intake will improve  Outcome: Ongoing     Problem: Nutrition  Goal: Optimal nutrition therapy  Outcome: Ongoing

## 2020-11-28 NOTE — DISCHARGE SUMMARY
81 mg by mouth daily      clopidogrel (PLAVIX) 75 MG tablet Take 75 mg by mouth daily      colchicine (COLCRYS) 0.6 MG tablet Take 0.6 mg by mouth daily      gabapentin (NEURONTIN) 100 MG capsule Take 100 mg by mouth 2 times daily. atorvastatin (LIPITOR) 40 MG tablet Take 40 mg by mouth daily      sodium chloride 0.9 % SOLN 50 mL with ertapenem 1 GM SOLR 1 g Infuse 1 g intravenously every 24 hours X 10 days (started 11/23)      predniSONE (DELTASONE) 10 MG tablet Take 10 mg by mouth daily      vitamin D (CHOLECALCIFEROL) 1000 UNIT TABS tablet Take 1,000 Units by mouth daily      latanoprost (XALATAN) 0.005 % ophthalmic solution Place 1 drop into the left eye nightly      brimonidine (ALPHAGAN) 0.2 % ophthalmic solution Place 1 drop into the left eye 3 times daily      acetaminophen (TYLENOL) 325 MG tablet Take 650 mg by mouth every 6 hours as needed. Associated Diagnoses: Melanoma of eye ([facility] [address]  Glaucoma; Edema           Current Discharge Medication List      STOP taking these medications       amLODIPine (NORVASC) 10 MG tablet Comments:   Reason for Stopping:         lisinopril (PRINIVIL;ZESTRIL) 40 MG tablet Comments:   Reason for Stopping:         hydrochlorothiazide (HYDRODIURIL) 25 MG tablet Comments:   Reason for Stopping:         timolol (BETIMOL) 0.25 % ophthalmic solution Comments:   Reason for Stopping:         atropine 1 % ophthalmic ointment Comments:   Reason for Stopping:         dorzolamide-timolol (COSOPT) 22.3-6.8 MG/ML ophthalmic solution Comments:   Reason for Stopping:                   Procedures: none    Assessment on Discharge: Stable, improved     Discharge ROS:  A complete review of systems was asked and negative except for none    Discharge Exam:  BP (!) 138/48   Pulse 101   Temp 98.4 °F (36.9 °C) (Oral)   Resp 18   Ht 5' 4\" (1.626 m)   Wt 123 lb 7.3 oz (56 kg)   SpO2 93%   BMI 21.19 kg/m²     General appearance: No apparent distress, appears stated age and cooperative. HEENT: Pupils equal, round, and reactive to light. Conjunctivae/corneas clear. Neck: Supple, with full range of motion. No jugular venous distention. Trachea midline. Respiratory:  Normal respiratory effort. Clear to auscultation, bilaterally without Rales/Wheezes/Rhonchi. Cardiovascular: Regular rate and rhythm with normal S1/S2 without murmurs, rubs or gallops. Abdomen: Soft, non-tender, non-distended with normal bowel sounds. Musculoskeletal: No clubbing, cyanosis or edema bilaterally. Full range of motion without deformity. Left foot wrapped in bandage with some drainage noted  Skin: Skin color, texture, turgor normal.  No rashes or lesions. Neurologic:  Neurovascularly intact without any focal sensory/motor deficits. Cranial nerves: II-XII intact, grossly non-focal.  Psychiatric: Alert and oriented, thought content appropriate, normal insight  Capillary Refill: Brisk,< 3 seconds   Peripheral Pulses: +2 palpable, equal bilaterally     Pertinent Studies During Hospital Stay:  Radiology:  Xr Foot Left (min 3 Views)    Result Date: 11/25/2020  EXAMINATION: THREE XRAY VIEWS OF THE LEFT FOOT 11/25/2020 1:57 pm COMPARISON: None. HISTORY: ORDERING SYSTEM PROVIDED HISTORY: s/p toe amputation, swelling/warmth noted. darker skin around area. concern osteo TECHNOLOGIST PROVIDED HISTORY: Reason for exam:->s/p toe amputation, swelling/warmth noted. darker skin around area. concern osteo Reason for Exam: s/p toe amputation, swelling/warmth noted. darker skin around area. concern osteo Acuity: Chronic Type of Exam: Ongoing FINDINGS: Images of the graded by overlying dressings/cloth. Status post resection of the distal left 2nd metatarsal bone and the 2nd toe. Mild-to-moderate hallux valgus deformity. Osteoarthritis. No fracture or focal bone erosion/destruction evident. Osteoarthritis at the midfoot as well.  Nonspecific soft tissue edema about the midfoot and forefoot predominates about the base of the hallux and remnant 2nd metatarsal bone. 1. Overlying artifacts degrades study quality. 2. Nonspecific soft tissue edema may be postsurgical or related to cellulitis, centered about the 2nd ray surgical site and hallux. 3. Osteoarthritis. 4. Hallux valgus deformity. 5. No definite acute osseous abnormality evident. Ct Chest Wo Contrast    Result Date: 11/25/2020  EXAMINATION: CT OF THE CHEST WITHOUT CONTRAST 11/25/2020 6:13 pm TECHNIQUE: CT of the chest was performed without the administration of intravenous contrast. Multiplanar reformatted images are provided for review. Dose modulation, iterative reconstruction, and/or weight based adjustment of the mA/kV was utilized to reduce the radiation dose to as low as reasonably achievable. COMPARISON: None. HISTORY: ORDERING SYSTEM PROVIDED HISTORY: SOB TECHNOLOGIST PROVIDED HISTORY: Reason for exam:-> SOB Reason for Exam: Shortness of Breath (for a couple days, COVID +, Sa O2 89-90 % on RA) Acuity: Acute Type of Exam: Initial FINDINGS: Mediastinum: Mild cardiomegaly. The great vessels appear unremarkable with exception of calcific atherosclerotic disease. No pericardial effusion. Posterior mediastinal structures appear unremarkable. No mediastinal or hilar adenopathy. The ascending thoracic aorta is 28 mm and descending thoracic aorta 24 mm. The main pulmonary artery measures 23 mm diameter. Lungs/pleura: Small volume bilateral pleural effusion. Moderately severe pulmonary infiltrates bilateral lungs predominate mid and lower lungs bilateral, typical in appearance for COVID-19. Focal dense consolidation developing superior segment left lower lobe. Indeterminate solid soft tissue nodule has the medial left pleural space axial series 2, image 39 is 14 mm. No inspissated secretions or endobronchial lesion evident. Upper Abdomen: Soft Tissues/Bones: Right upper extremity PICC tip is at the distal superior vena cava level.      1. Moderately severe pulmonary involvement with infiltrates typical of COVID-19. 2. Small volume bilateral pleural effusion. 3. 14.0 mm solid pulmonary nodule left pleural region. Consider a non-contrast Chest CT at 3 months, a PET/CT, or tissue sampling. * 4. Calcific atherosclerosis aorta and coronary arteries. 5. Mild cardiomegaly. Peak ______________________________________________________________________________ ____ *These guidelines do not apply to immunocompromised patients and patients with cancer. Follow up in patients with significant comorbidities as clinically warranted. For lung cancer screening, adhere to Lung-RADS guidelines. Reference: Radiology. 2017; 284(1):228-43. Xr Chest Portable    Result Date: 11/25/2020  EXAMINATION: ONE XRAY VIEW OF THE CHEST 11/25/2020 11:20 am COMPARISON: January 11, 2007 HISTORY: ORDERING SYSTEM PROVIDED HISTORY: SOB, PICC verification TECHNOLOGIST PROVIDED HISTORY: Reason for exam:->SOB, PICC verification Reason for Exam: SOB, PICC verification Acuity: Chronic Type of Exam: Ongoing FINDINGS: Diffuse reticular opacity throughout both lungs is present. Right-sided PICC line terminates in the lower SVC. No evidence of pneumothorax or pleural effusion. Bilateral pulmonary disease, new from 2007. This may be due to interstitial disease, either acute interstitial edema or chronic interstitial disease. Diffuse infection can also have this appearance.            Last Labs on Discharge:     Recent Results (from the past 24 hour(s))   POCT Glucose    Collection Time: 11/27/20  7:44 PM   Result Value Ref Range    POC Glucose 302 (H) 70 - 99 mg/dl    Performed on ACCU-CHEK    Basic Metabolic Panel w/ Reflex to MG    Collection Time: 11/28/20  6:00 AM   Result Value Ref Range    Sodium 139 136 - 145 mmol/L    Potassium reflex Magnesium 3.9 3.5 - 5.1 mmol/L    Chloride 103 99 - 110 mmol/L    CO2 27 21 - 32 mmol/L    Anion Gap 9 3 - 16    Glucose 168 (H) 70 - 99 mg/dL    BUN 22 (H) 7 - 20 mg/dL CREATININE 0.8 0.6 - 1.2 mg/dL    GFR Non-African American >60 >60    GFR African American >60 >60    Calcium 8.6 8.3 - 10.6 mg/dL   CBC    Collection Time: 11/28/20  6:00 AM   Result Value Ref Range    WBC 10.3 4.0 - 11.0 K/uL    RBC 3.11 (L) 4.00 - 5.20 M/uL    Hemoglobin 8.9 (L) 12.0 - 16.0 g/dL    Hematocrit 26.8 (L) 36.0 - 48.0 %    MCV 86.3 80.0 - 100.0 fL    MCH 28.6 26.0 - 34.0 pg    MCHC 33.2 31.0 - 36.0 g/dL    RDW 18.1 (H) 12.4 - 15.4 %    Platelets 050 080 - 426 K/uL    MPV 7.9 5.0 - 10.5 fL   Hepatic Function Panel    Collection Time: 11/28/20  6:00 AM   Result Value Ref Range    Total Protein 4.8 (L) 6.4 - 8.2 g/dL    Alb 2.2 (L) 3.4 - 5.0 g/dL    Alkaline Phosphatase 92 40 - 129 U/L    ALT 16 10 - 40 U/L    AST 15 15 - 37 U/L    Total Bilirubin <0.2 0.0 - 1.0 mg/dL    Bilirubin, Direct <0.2 0.0 - 0.3 mg/dL    Bilirubin, Indirect see below 0.0 - 1.0 mg/dL   POCT Glucose    Collection Time: 11/28/20  8:34 AM   Result Value Ref Range    POC Glucose 137 (H) 70 - 99 mg/dl    Performed on ACCU-CHEK    POCT Glucose    Collection Time: 11/28/20 12:20 PM   Result Value Ref Range    POC Glucose 248 (H) 70 - 99 mg/dl    Performed on ACCU-CHEK          Follow up: with Campos Wade MD    Note that over 30 minutes was spent in preparing discharge papers, discussing discharge with patient, medication review, etc.    Thank you Campos Wade MD for the opportunity to be involved in this patient's care. If you have any questions or concerns please feel free to contact me at [phone].     Electronically signed by Donya Stiles MD on 11/28/2020 at 5:27 PM

## 2020-11-29 NOTE — CARE COORDINATION
[facility] recd home care order. Pt dc yesterday.   [facility] contact [id number] home care today and they will pull home care orders and resume services with pt  Electronically signed by [id number] RAI Guidry on 11/29/2020 at 5:25 PM

## 2020-11-30 ENCOUNTER — APPOINTMENT (OUTPATIENT)
Dept: GENERAL RADIOLOGY | Age: 85
DRG: 602 | End: 2020-11-30
Payer: MEDICARE

## 2020-11-30 ENCOUNTER — HOSPITAL ENCOUNTER (INPATIENT)
Age: 85
LOS: 4 days | Discharge: HOME HEALTH | DRG: 602 | End: 2020-12-04
Attending: INTERNAL MEDICINE | Admitting: INTERNAL MEDICINE
Payer: MEDICARE

## 2020-11-30 PROBLEM — M86.9 OSTEOMYELITIS (HCC): Status: ACTIVE | Noted: 2020-11-30

## 2020-11-30 LAB
ANION GAP SERPL CALCULATED.3IONS-SCNC: 11 MMOL/L (ref 3–16)
BASE EXCESS VENOUS: 3.4 MMOL/L
BASOPHILS ABSOLUTE: 0.1 K/UL (ref 0–0.2)
BASOPHILS RELATIVE PERCENT: 0.7 %
BUN BLDV-MCNC: 24 MG/DL (ref 7–20)
C-REACTIVE PROTEIN: 29.4 MG/L (ref 0–5.1)
CALCIUM SERPL-MCNC: 9.6 MG/DL (ref 8.3–10.6)
CARBOXYHEMOGLOBIN: 1.6 %
CHLORIDE BLD-SCNC: 104 MMOL/L (ref 99–110)
CO2: 26 MMOL/L (ref 21–32)
CREAT SERPL-MCNC: 1 MG/DL (ref 0.6–1.2)
D DIMER: 3720 NG/ML DDU (ref 0–229)
EKG ATRIAL RATE: 132 BPM
EKG DIAGNOSIS: NORMAL
EKG P AXIS: 67 DEGREES
EKG P-R INTERVAL: 132 MS
EKG Q-T INTERVAL: 278 MS
EKG QRS DURATION: 82 MS
EKG QTC CALCULATION (BAZETT): 411 MS
EKG R AXIS: 0 DEGREES
EKG T AXIS: 38 DEGREES
EKG VENTRICULAR RATE: 132 BPM
EOSINOPHILS ABSOLUTE: 0.1 K/UL (ref 0–0.6)
EOSINOPHILS RELATIVE PERCENT: 0.7 %
GFR AFRICAN AMERICAN: >60
GFR NON-AFRICAN AMERICAN: 52
GLUCOSE BLD-MCNC: 109 MG/DL (ref 70–99)
HCO3 VENOUS: 26 MMOL/L (ref 23–29)
HCT VFR BLD CALC: 37.4 % (ref 36–48)
HEMOGLOBIN: 11.9 G/DL (ref 12–16)
LACTIC ACID: 1.2 MMOL/L (ref 0.4–2)
LYMPHOCYTES ABSOLUTE: 1.7 K/UL (ref 1–5.1)
LYMPHOCYTES RELATIVE PERCENT: 20.3 %
MCH RBC QN AUTO: 28.1 PG (ref 26–34)
MCHC RBC AUTO-ENTMCNC: 31.8 G/DL (ref 31–36)
MCV RBC AUTO: 88.3 FL (ref 80–100)
METHEMOGLOBIN VENOUS: 0.6 %
MONOCYTES ABSOLUTE: 0.5 K/UL (ref 0–1.3)
MONOCYTES RELATIVE PERCENT: 5.8 %
NEUTROPHILS ABSOLUTE: 6 K/UL (ref 1.7–7.7)
NEUTROPHILS RELATIVE PERCENT: 72.5 %
O2 CONTENT, VEN: 14 ML/DL
O2 SAT, VEN: 95 %
O2 THERAPY: ABNORMAL
PCO2, VEN: 33.4 MMHG (ref 40–50)
PDW BLD-RTO: 18.8 % (ref 12.4–15.4)
PH VENOUS: 7.51 (ref 7.35–7.45)
PLATELET # BLD: 453 K/UL (ref 135–450)
PMV BLD AUTO: 8 FL (ref 5–10.5)
PO2, VEN: 69 MMHG
POTASSIUM REFLEX MAGNESIUM: 3.7 MMOL/L (ref 3.5–5.1)
PRO-BNP: 1046 PG/ML (ref 0–449)
RBC # BLD: 4.24 M/UL (ref 4–5.2)
REASON FOR REJECTION: NORMAL
REJECTED TEST: NORMAL
SARS-COV-2, NAAT: DETECTED
SEDIMENTATION RATE, ERYTHROCYTE: 67 MM/HR (ref 0–30)
SODIUM BLD-SCNC: 141 MMOL/L (ref 136–145)
TCO2 CALC VENOUS: 27 MMOL/L
TROPONIN: 0.02 NG/ML
WBC # BLD: 8.3 K/UL (ref 4–11)

## 2020-11-30 PROCEDURE — 2580000003 HC RX 258: Performed by: STUDENT IN AN ORGANIZED HEALTH CARE EDUCATION/TRAINING PROGRAM

## 2020-11-30 PROCEDURE — 93010 ELECTROCARDIOGRAM REPORT: CPT | Performed by: INTERNAL MEDICINE

## 2020-11-30 PROCEDURE — U0002 COVID-19 LAB TEST NON-CDC: HCPCS

## 2020-11-30 PROCEDURE — 83605 ASSAY OF LACTIC ACID: CPT

## 2020-11-30 PROCEDURE — 99285 EMERGENCY DEPT VISIT HI MDM: CPT

## 2020-11-30 PROCEDURE — 84484 ASSAY OF TROPONIN QUANT: CPT

## 2020-11-30 PROCEDURE — 96367 TX/PROPH/DG ADDL SEQ IV INF: CPT

## 2020-11-30 PROCEDURE — 6370000000 HC RX 637 (ALT 250 FOR IP): Performed by: INTERNAL MEDICINE

## 2020-11-30 PROCEDURE — 93005 ELECTROCARDIOGRAM TRACING: CPT | Performed by: STUDENT IN AN ORGANIZED HEALTH CARE EDUCATION/TRAINING PROGRAM

## 2020-11-30 PROCEDURE — 1200000000 HC SEMI PRIVATE

## 2020-11-30 PROCEDURE — 2580000003 HC RX 258: Performed by: INTERNAL MEDICINE

## 2020-11-30 PROCEDURE — 83880 ASSAY OF NATRIURETIC PEPTIDE: CPT

## 2020-11-30 PROCEDURE — 36415 COLL VENOUS BLD VENIPUNCTURE: CPT

## 2020-11-30 PROCEDURE — 86140 C-REACTIVE PROTEIN: CPT

## 2020-11-30 PROCEDURE — 73630 X-RAY EXAM OF FOOT: CPT

## 2020-11-30 PROCEDURE — 96361 HYDRATE IV INFUSION ADD-ON: CPT

## 2020-11-30 PROCEDURE — 87040 BLOOD CULTURE FOR BACTERIA: CPT

## 2020-11-30 PROCEDURE — 71045 X-RAY EXAM CHEST 1 VIEW: CPT

## 2020-11-30 PROCEDURE — 6360000002 HC RX W HCPCS: Performed by: STUDENT IN AN ORGANIZED HEALTH CARE EDUCATION/TRAINING PROGRAM

## 2020-11-30 PROCEDURE — 85379 FIBRIN DEGRADATION QUANT: CPT

## 2020-11-30 PROCEDURE — 6360000002 HC RX W HCPCS: Performed by: INTERNAL MEDICINE

## 2020-11-30 PROCEDURE — 80048 BASIC METABOLIC PNL TOTAL CA: CPT

## 2020-11-30 PROCEDURE — 85025 COMPLETE CBC W/AUTO DIFF WBC: CPT

## 2020-11-30 PROCEDURE — 96365 THER/PROPH/DIAG IV INF INIT: CPT

## 2020-11-30 PROCEDURE — 82803 BLOOD GASES ANY COMBINATION: CPT

## 2020-11-30 PROCEDURE — 85652 RBC SED RATE AUTOMATED: CPT

## 2020-11-30 RX ORDER — ASPIRIN 81 MG/1
81 TABLET ORAL DAILY
Status: DISCONTINUED | OUTPATIENT
Start: 2020-12-01 | End: 2020-12-04 | Stop reason: HOSPADM

## 2020-11-30 RX ORDER — POTASSIUM CHLORIDE 7.45 MG/ML
10 INJECTION INTRAVENOUS PRN
Status: DISCONTINUED | OUTPATIENT
Start: 2020-11-30 | End: 2020-11-30 | Stop reason: CLARIF

## 2020-11-30 RX ORDER — 0.9 % SODIUM CHLORIDE 0.9 %
1000 INTRAVENOUS SOLUTION INTRAVENOUS ONCE
Status: COMPLETED | OUTPATIENT
Start: 2020-11-30 | End: 2020-11-30

## 2020-11-30 RX ORDER — GABAPENTIN 100 MG/1
100 CAPSULE ORAL 2 TIMES DAILY
Status: DISCONTINUED | OUTPATIENT
Start: 2020-11-30 | End: 2020-12-04 | Stop reason: HOSPADM

## 2020-11-30 RX ORDER — BRIMONIDINE TARTRATE 2 MG/ML
1 SOLUTION/ DROPS OPHTHALMIC 3 TIMES DAILY
Status: DISCONTINUED | OUTPATIENT
Start: 2020-11-30 | End: 2020-12-04 | Stop reason: HOSPADM

## 2020-11-30 RX ORDER — ACETAMINOPHEN 650 MG/1
650 SUPPOSITORY RECTAL EVERY 6 HOURS PRN
Status: DISCONTINUED | OUTPATIENT
Start: 2020-11-30 | End: 2020-12-04 | Stop reason: HOSPADM

## 2020-11-30 RX ORDER — POTASSIUM CHLORIDE 20 MEQ/1
40 TABLET, EXTENDED RELEASE ORAL PRN
Status: DISCONTINUED | OUTPATIENT
Start: 2020-11-30 | End: 2020-11-30 | Stop reason: CLARIF

## 2020-11-30 RX ORDER — VITAMIN B COMPLEX
1000 TABLET ORAL DAILY
Status: DISCONTINUED | OUTPATIENT
Start: 2020-12-01 | End: 2020-12-04 | Stop reason: HOSPADM

## 2020-11-30 RX ORDER — PROMETHAZINE HYDROCHLORIDE 25 MG/1
12.5 TABLET ORAL EVERY 6 HOURS PRN
Status: DISCONTINUED | OUTPATIENT
Start: 2020-11-30 | End: 2020-12-04 | Stop reason: HOSPADM

## 2020-11-30 RX ORDER — LATANOPROST 50 UG/ML
1 SOLUTION/ DROPS OPHTHALMIC NIGHTLY
Status: DISCONTINUED | OUTPATIENT
Start: 2020-11-30 | End: 2020-12-04 | Stop reason: HOSPADM

## 2020-11-30 RX ORDER — HYDROCODONE BITARTRATE AND ACETAMINOPHEN 5; 325 MG/1; MG/1
1 TABLET ORAL EVERY 8 HOURS PRN
COMMUNITY

## 2020-11-30 RX ORDER — PREDNISONE 1 MG/1
10 TABLET ORAL DAILY
Status: DISCONTINUED | OUTPATIENT
Start: 2020-12-01 | End: 2020-12-04 | Stop reason: HOSPADM

## 2020-11-30 RX ORDER — COLCHICINE 0.6 MG/1
0.6 TABLET ORAL DAILY
Status: DISCONTINUED | OUTPATIENT
Start: 2020-12-01 | End: 2020-12-04 | Stop reason: HOSPADM

## 2020-11-30 RX ORDER — CLOPIDOGREL BISULFATE 75 MG/1
75 TABLET ORAL DAILY
Status: DISCONTINUED | OUTPATIENT
Start: 2020-12-01 | End: 2020-12-04 | Stop reason: HOSPADM

## 2020-11-30 RX ORDER — ONDANSETRON 2 MG/ML
4 INJECTION INTRAMUSCULAR; INTRAVENOUS EVERY 6 HOURS PRN
Status: DISCONTINUED | OUTPATIENT
Start: 2020-11-30 | End: 2020-12-04 | Stop reason: HOSPADM

## 2020-11-30 RX ORDER — ATORVASTATIN CALCIUM 40 MG/1
40 TABLET, FILM COATED ORAL NIGHTLY
Status: DISCONTINUED | OUTPATIENT
Start: 2020-11-30 | End: 2020-12-04 | Stop reason: HOSPADM

## 2020-11-30 RX ORDER — MAGNESIUM SULFATE IN WATER 40 MG/ML
2 INJECTION, SOLUTION INTRAVENOUS PRN
Status: DISCONTINUED | OUTPATIENT
Start: 2020-11-30 | End: 2020-11-30 | Stop reason: CLARIF

## 2020-11-30 RX ORDER — ACETAMINOPHEN 325 MG/1
650 TABLET ORAL EVERY 6 HOURS PRN
Status: DISCONTINUED | OUTPATIENT
Start: 2020-11-30 | End: 2020-11-30 | Stop reason: SDUPTHER

## 2020-11-30 RX ORDER — SODIUM CHLORIDE 0.9 % (FLUSH) 0.9 %
10 SYRINGE (ML) INJECTION EVERY 12 HOURS SCHEDULED
Status: DISCONTINUED | OUTPATIENT
Start: 2020-11-30 | End: 2020-12-04 | Stop reason: HOSPADM

## 2020-11-30 RX ORDER — SODIUM CHLORIDE 0.9 % (FLUSH) 0.9 %
10 SYRINGE (ML) INJECTION PRN
Status: DISCONTINUED | OUTPATIENT
Start: 2020-11-30 | End: 2020-12-04 | Stop reason: HOSPADM

## 2020-11-30 RX ORDER — POLYETHYLENE GLYCOL 3350 17 G/17G
17 POWDER, FOR SOLUTION ORAL DAILY PRN
Status: DISCONTINUED | OUTPATIENT
Start: 2020-11-30 | End: 2020-12-04 | Stop reason: HOSPADM

## 2020-11-30 RX ORDER — HEPARIN SODIUM 5000 [USP'U]/ML
5000 INJECTION, SOLUTION INTRAVENOUS; SUBCUTANEOUS EVERY 8 HOURS SCHEDULED
Status: DISCONTINUED | OUTPATIENT
Start: 2020-11-30 | End: 2020-12-04 | Stop reason: HOSPADM

## 2020-11-30 RX ORDER — FLUTICASONE PROPIONATE 50 MCG
1 SPRAY, SUSPENSION (ML) NASAL DAILY
Status: DISCONTINUED | OUTPATIENT
Start: 2020-11-30 | End: 2020-12-04 | Stop reason: HOSPADM

## 2020-11-30 RX ORDER — ACETAMINOPHEN 325 MG/1
650 TABLET ORAL EVERY 6 HOURS PRN
Status: DISCONTINUED | OUTPATIENT
Start: 2020-11-30 | End: 2020-12-04 | Stop reason: HOSPADM

## 2020-11-30 RX ORDER — HYDROCODONE BITARTRATE AND ACETAMINOPHEN 5; 325 MG/1; MG/1
1 TABLET ORAL EVERY 8 HOURS PRN
Status: DISCONTINUED | OUTPATIENT
Start: 2020-11-30 | End: 2020-12-04 | Stop reason: HOSPADM

## 2020-11-30 RX ADMIN — SODIUM CHLORIDE, PRESERVATIVE FREE 10 ML: 5 INJECTION INTRAVENOUS at 22:19

## 2020-11-30 RX ADMIN — GABAPENTIN 100 MG: 100 CAPSULE ORAL at 22:18

## 2020-11-30 RX ADMIN — SODIUM CHLORIDE 1000 ML: 9 INJECTION, SOLUTION INTRAVENOUS at 10:57

## 2020-11-30 RX ADMIN — ATORVASTATIN CALCIUM 40 MG: 40 TABLET, FILM COATED ORAL at 22:18

## 2020-11-30 RX ADMIN — MEROPENEM 500 MG: 500 INJECTION, POWDER, FOR SOLUTION INTRAVENOUS at 22:19

## 2020-11-30 RX ADMIN — VANCOMYCIN HYDROCHLORIDE 1250 MG: 10 INJECTION, POWDER, LYOPHILIZED, FOR SOLUTION INTRAVENOUS at 12:50

## 2020-11-30 RX ADMIN — FLUTICASONE PROPIONATE 1 SPRAY: 50 SPRAY, METERED NASAL at 18:06

## 2020-11-30 RX ADMIN — BRIMONIDINE TARTRATE 1 DROP: 2 SOLUTION OPHTHALMIC at 22:19

## 2020-11-30 RX ADMIN — CEFEPIME HYDROCHLORIDE 1 G: 1 INJECTION, POWDER, FOR SOLUTION INTRAMUSCULAR; INTRAVENOUS at 13:29

## 2020-11-30 RX ADMIN — HEPARIN SODIUM 5000 UNITS: 5000 INJECTION INTRAVENOUS; SUBCUTANEOUS at 22:51

## 2020-11-30 RX ADMIN — LATANOPROST 1 DROP: 50 SOLUTION OPHTHALMIC at 22:19

## 2020-11-30 ASSESSMENT — PAIN SCALES - GENERAL
PAINLEVEL_OUTOF10: 0

## 2020-11-30 NOTE — ED PROVIDER NOTES
[facility]      Pt Name: James Borjas  MRN: [id number]  [place] 7/4/1933  Date of evaluation: 11/30/2020  Provider: Jake King MD    CHIEF COMPLAINT       Chief Complaint   Patient presents with    Tachycardia     dx with covid 2-3 weeks ago, had surgery on her right foot in the last few weeks         HISTORY OF PRESENT ILLNESS   (Location/Symptom, Timing/Onset,Context/Setting, Quality, Duration, Modifying Factors, Severity)  Note limiting factors. James Borjas is a 80 y.o. female who presents to the emergency department complaining of palpitations, feeling like her heart is racing for the past 24 hours. Onset sudden, occurred yesterday, persistent in nature, similar to when she had Covid. Denies chest pain or shortness of breath, is associated with increase in the drainage and skin breakdown of her left foot wound from a recent second ray transmetatarsal amputation approximately 2 to 3 weeks ago at [facility].  She is currently being treated for a wound infection with ertapenem. Denies increased pain in the area though the patient states she cannot feel her feet very well at baseline. Denies fevers, chills, nausea, vomiting. Symptoms not otherwise alleviated or exacerbated by other factors. NursingNotes were reviewed. REVIEW OF SYSTEMS    (2-9 systems for level 4, 10 or more for level 5)       Constitutional: No fever or chills. Eye: No visual disturbances. No eye pain. Ear/Nose/Mouth/Throat: No nasal congestion. No sore throat. Respiratory: No cough, No shortness of breath, No sputum production. Cardiovascular: No chest pain. No palpitations. Gastrointestinal: No abdominal pain. No nausea or vomiting  Genitourinary: No dysuria. No hematuria. Hematology/Lymphatics: No bleeding or bruising tendency. Immunologic: No malaise. No swollen glands. Musculoskeletal: No back pain. No joint pain. Integumentary: No rash.  No abrasions. Skin breakdown as in HPI. Neurologic: No headache. No focal numbness or weakness. PAST MEDICAL HISTORY     Past Medical History:   Diagnosis Date    Chronic kidney disease     Gout     Hyperlipidemia     Hypertension          SURGICALHISTORY       Past Surgical History:   Procedure Laterality Date    APPENDECTOMY      EYE SURGERY  2004    XRT plaque OD for melanoma    FOOT SURGERY      left         CURRENT MEDICATIONS       Previous Medications    ACETAMINOPHEN (TYLENOL) 325 MG TABLET    Take 650 mg by mouth every 6 hours as needed. ASPIRIN LOW DOSE 81 MG EC TABLET    Take 81 mg by mouth daily    ATORVASTATIN (LIPITOR) 40 MG TABLET    Take 40 mg by mouth daily    BRIMONIDINE (ALPHAGAN) 0.2 % OPHTHALMIC SOLUTION    Place 1 drop into the left eye 3 times daily    CLOPIDOGREL (PLAVIX) 75 MG TABLET    Take 75 mg by mouth daily    COLCHICINE (COLCRYS) 0.6 MG TABLET    Take 0.6 mg by mouth daily    GABAPENTIN (NEURONTIN) 100 MG CAPSULE    Take 100 mg by mouth 2 times daily. LATANOPROST (XALATAN) 0.005 % OPHTHALMIC SOLUTION    Place 1 drop into the left eye nightly    PREDNISONE (DELTASONE) 10 MG TABLET    Take 10 mg by mouth daily    SODIUM CHLORIDE 0.9 % SOLN 50 ML WITH ERTAPENEM 1 GM SOLR 1 G    Infuse 1 g intravenously every 24 hours X 10 days (started 11/23)    VITAMIN D (CHOLECALCIFEROL) 1000 UNIT TABS TABLET    Take 1,000 Units by mouth daily       ALLERGIES     Patient has no known allergies. FAMILY HISTORY       Family History   Problem Relation Age of Onset    Cancer Father         colon          SOCIAL HISTORY       Social History     Socioeconomic History    Marital status:       Spouse name: None    Number of children: None    Years of education: None    Highest education level: None   Occupational History    None   Social Needs    Financial resource strain: None    Food insecurity     Worry: None     Inability: None    Transportation needs     Medical: None with underlying exudative granulation tissue and the surgical flap itself appears devascularized, necrotic. Patient does not have much sensation in this area at all, only slightly tender to palpation. Neurologic: Alert and appropriate for age. No focal deficits. Psychiatric: Cooperative. DIAGNOSTIC RESULTS     EKG: All EKG's are interpreted by the Emergency Department Physician who either signs or Co-signsthis chart in the absence of a cardiologist.    The Ekg interpreted by me shows  sinus tachycardia, hlxg=444 bpm   Axis is   Normal  QTc is  normal  Intervals and Durations are unremarkable. ST Segments: no acute change  No significant change from prior EKG dated November 26, 2020. RADIOLOGY:   Non-plain filmimages such as CT, Ultrasound and MRI are read by the radiologist. Plain radiographic images are visualized and preliminarily interpreted by the emergency physician with the below findings:      Interpretation per the Radiologist below, if available at the time ofthis note:    XR FOOT LEFT (MIN 3 VIEWS)   Final Result   Diffuse soft tissue swelling predominating about the forefoot. Correlate   clinically cellulitis. Discrete erosions the 2nd metatarsal osteotomy site there is persistent   clinical concern for underlying osteomyelitis, MRI is more sensitive. Unchanged erosive findings at the 1st metatarsal head. Query underlying   erosive or inflammatory arthropathy such as gout. XR CHEST PORTABLE   Final Result   No significant interval change in perihilar and basilar opacity which can   reflect edema or in the appropriate clinical setting, pneumonia. Chronic   interstitial disease can appear similar.                ED BEDSIDE ULTRASOUND:   Performed by ED Physician - none    LABS:  Labs Reviewed   CBC WITH AUTO DIFFERENTIAL - Abnormal; Notable for the following components:       Result Value    Hemoglobin 11.9 (*)     RDW 18.8 (*)     Platelets 831 (*)     All other components within normal limits    Narrative:     Performed at:  [place] [address]   Phone [phone]   BASIC METABOLIC PANEL W/ REFLEX TO MG FOR LOW K - Abnormal; Notable for the following components:    Glucose 109 (*)     BUN 24 (*)     GFR Non- 52 (*)     All other components within normal limits    Narrative:     Performed at:  [place] [address]   Phone [phone]   D-DIMER, QUANTITATIVE - Abnormal; Notable for the following components:    D-Dimer, Quant 3720 (*)     All other components within normal limits    Narrative:     Performed at:  [place] [address]   Phone [phone]   TROPONIN - Abnormal; Notable for the following components:    Troponin 0.02 (*)     All other components within normal limits    Narrative:     Performed at:  [place] [address]   Phone [phone]   BRAIN NATRIURETIC PEPTIDE - Abnormal; Notable for the following components:    Pro-BNP 1,046 (*)     All other components within normal limits    Narrative:     Performed at:  [place] [address]   Phone [phone]   BLOOD GAS, VENOUS - Abnormal; Notable for the following components:    pH, Alfred 7.506 (*)     pCO2, Alfred 33.4 (*)     All other components within normal limits    Narrative:     Performed at:  [place] [address]   Phone [phone]   C-REACTIVE PROTEIN - Abnormal; Notable for the following components:    CRP 29.4 (*)     All other components within normal limits    Narrative:     Performed at:  [facility]  [address] [place] [address] positive for Covid. They request that we send Covid testing and if negative will consider accepting the patient. Patient tested positive for Covid and has requires further care, Dr. Clark Records admitted the patient to the hospitalist team here at [facility] and will facilitate her further treatment. CONSULTS:  IP CONSULT TO INTERNAL MEDICINE  IP CONSULT TO PODIATRY    PROCEDURES:  Unless otherwise noted below, none         FINAL IMPRESSION      1. Left foot infection    2. Cellulitis of left lower extremity    3. Subacute osteomyelitis of left foot [facility])          DISPOSITION/PLAN   DISPOSITION  Admitted inpatient.          (Please note that portions of this note were completed with a voice recognition program.Efforts were made to edit the dictations but occasionally words are mis-transcribed.)    Katty Hewitt MD (electronically signed)  Attending Emergency Physician          Katty Hewitt MD  11/30/20 0683

## 2020-11-30 NOTE — H&P
HYDROcodone-acetaminophen (NORCO) 5-325 MG per tablet Take 1 tablet by mouth every 8 hours as needed for Pain. Yes Historical Provider, MD   ASPIRIN LOW DOSE 81 MG EC tablet Take 81 mg by mouth daily 11/5/20  Yes Historical Provider, MD   clopidogrel (PLAVIX) 75 MG tablet Take 75 mg by mouth daily 11/5/20  Yes Historical Provider, MD   colchicine (COLCRYS) 0.6 MG tablet Take 0.6 mg by mouth daily 11/23/20  Yes Historical Provider, MD   gabapentin (NEURONTIN) 100 MG capsule Take 100 mg by mouth 2 times daily. 11/5/20  Yes Historical Provider, MD   atorvastatin (LIPITOR) 40 MG tablet Take 40 mg by mouth daily 11/10/20  Yes Historical Provider, MD   sodium chloride 0.9 % SOLN 50 mL with ertapenem 1 GM SOLR 1 g Infuse 1 g intravenously every 24 hours X 10 days (started 11/23) 11/23/20 12/2/20 Yes Historical Provider, MD   predniSONE (DELTASONE) 10 MG tablet Take 10 mg by mouth daily   Yes Historical Provider, MD   vitamin D (CHOLECALCIFEROL) 1000 UNIT TABS tablet Take 1,000 Units by mouth daily   Yes Historical Provider, MD   latanoprost (XALATAN) 0.005 % ophthalmic solution Place 1 drop into the left eye nightly   Yes Historical Provider, MD   brimonidine (ALPHAGAN) 0.2 % ophthalmic solution Place 1 drop into the left eye 3 times daily   Yes Historical Provider, MD   acetaminophen (TYLENOL) 325 MG tablet Take 650 mg by mouth every 6 hours as needed. Yes Historical Provider, MD       Allergies:  Patient has no known allergies. Social History:  The patient currently lives at home    TOBACCO:   reports that she has never smoked. She has never used smokeless tobacco.  ETOH:   reports no history of alcohol use. Family History:  Reviewed in detail and negative for DM, Early CAD, Cancer, CVA. Positive as follows:        Problem Relation Age of Onset    Cancer Father         colon       REVIEW OF SYSTEMS:   Positive for as noted in the HPI. All other systems reviewed and negative.     PHYSICAL EXAM:    BP (!) BILITOT <0.2   ALKPHOS 92     COAG  No results for input(s): INR in the last 72 hours. CARDIAC ENZYMES  Recent Labs     11/30/20  1050   TROPONINI 0.02*       U/A:    Lab Results   Component Value Date    COLORU YELLOW 11/25/2020    WBCUA 2 11/25/2020    RBCUA 1 11/25/2020    CLARITYU Clear 11/25/2020    SPECGRAV 1.014 11/25/2020    LEUKOCYTESUR Negative 11/25/2020    BLOODU Negative 11/25/2020    GLUCOSEU Negative 11/25/2020       ABG  No results found for: QPQ3JCV, BEART, X0HGKYCQ, PHART, THGBART, TOI1RDA, PO2ART, CSV0SSZ      PHYSICIANS CERTIFICATION:    I certify that Laxmi Mathew is expected to be hospitalized for more than 2 midnights based on the following assessment and plan:      ASSESSMENT/PLAN:    Left foot infection  Concern for osteomyelitis  Continue IV antibiotics  Podiatry consulted for possible surgical intervention and debridement  Elevated ESR and CRP  Patient with a polymicrobial infection with staph aureus along with enterobacteria cloaca at [facility]    COVID-19 pneumonia  Not requiring supplemental oxygen  Recently finished therapy   continue to monitor  With continue elevated D-dimer we will continue anticoagulation    Hyperlipidemia  Continue home statin      DVT Prophylaxis: Lovenox  Diet: No diet orders on file  Code Status: Prior  PT/OT Eval Status: Will need    Allie Marley MD    Thank you Talia Olguin MD for the opportunity to be involved in this patient's care. If you have any questions or concerns please feel free to contact me at [phone].

## 2020-11-30 NOTE — ED NOTES
Bed: [facility]  Expected date:   Expected time:   Means of arrival: [facility]  Comments:  87F heart racing, mervat Shea RN  11/30/20 1480

## 2020-12-01 LAB
ANION GAP SERPL CALCULATED.3IONS-SCNC: 8 MMOL/L (ref 3–16)
BASOPHILS ABSOLUTE: 0 K/UL (ref 0–0.2)
BASOPHILS RELATIVE PERCENT: 0.7 %
BUN BLDV-MCNC: 13 MG/DL (ref 7–20)
CALCIUM SERPL-MCNC: 8.5 MG/DL (ref 8.3–10.6)
CHLORIDE BLD-SCNC: 109 MMOL/L (ref 99–110)
CO2: 26 MMOL/L (ref 21–32)
CREAT SERPL-MCNC: 0.8 MG/DL (ref 0.6–1.2)
EOSINOPHILS ABSOLUTE: 0.1 K/UL (ref 0–0.6)
EOSINOPHILS RELATIVE PERCENT: 1.1 %
GFR AFRICAN AMERICAN: >60
GFR NON-AFRICAN AMERICAN: >60
GLUCOSE BLD-MCNC: 112 MG/DL (ref 70–99)
HCT VFR BLD CALC: 29.7 % (ref 36–48)
HEMOGLOBIN: 9.8 G/DL (ref 12–16)
LYMPHOCYTES ABSOLUTE: 0.7 K/UL (ref 1–5.1)
LYMPHOCYTES RELATIVE PERCENT: 12.2 %
MCH RBC QN AUTO: 28.4 PG (ref 26–34)
MCHC RBC AUTO-ENTMCNC: 32.9 G/DL (ref 31–36)
MCV RBC AUTO: 86.2 FL (ref 80–100)
MONOCYTES ABSOLUTE: 0.4 K/UL (ref 0–1.3)
MONOCYTES RELATIVE PERCENT: 7.6 %
NEUTROPHILS ABSOLUTE: 4.5 K/UL (ref 1.7–7.7)
NEUTROPHILS RELATIVE PERCENT: 78.4 %
PDW BLD-RTO: 18.4 % (ref 12.4–15.4)
PLATELET # BLD: 410 K/UL (ref 135–450)
PMV BLD AUTO: 8 FL (ref 5–10.5)
POTASSIUM REFLEX MAGNESIUM: 3.7 MMOL/L (ref 3.5–5.1)
RBC # BLD: 3.44 M/UL (ref 4–5.2)
SODIUM BLD-SCNC: 143 MMOL/L (ref 136–145)
WBC # BLD: 5.7 K/UL (ref 4–11)

## 2020-12-01 PROCEDURE — 97166 OT EVAL MOD COMPLEX 45 MIN: CPT

## 2020-12-01 PROCEDURE — 6370000000 HC RX 637 (ALT 250 FOR IP): Performed by: INTERNAL MEDICINE

## 2020-12-01 PROCEDURE — 97162 PT EVAL MOD COMPLEX 30 MIN: CPT

## 2020-12-01 PROCEDURE — 6360000002 HC RX W HCPCS: Performed by: INTERNAL MEDICINE

## 2020-12-01 PROCEDURE — 36415 COLL VENOUS BLD VENIPUNCTURE: CPT

## 2020-12-01 PROCEDURE — 85025 COMPLETE CBC W/AUTO DIFF WBC: CPT

## 2020-12-01 PROCEDURE — 97530 THERAPEUTIC ACTIVITIES: CPT

## 2020-12-01 PROCEDURE — 2500000003 HC RX 250 WO HCPCS: Performed by: NURSE PRACTITIONER

## 2020-12-01 PROCEDURE — 80048 BASIC METABOLIC PNL TOTAL CA: CPT

## 2020-12-01 PROCEDURE — 97535 SELF CARE MNGMENT TRAINING: CPT

## 2020-12-01 PROCEDURE — 2580000003 HC RX 258: Performed by: INTERNAL MEDICINE

## 2020-12-01 PROCEDURE — 1200000000 HC SEMI PRIVATE

## 2020-12-01 PROCEDURE — 97116 GAIT TRAINING THERAPY: CPT

## 2020-12-01 RX ORDER — METOPROLOL TARTRATE 5 MG/5ML
5 INJECTION INTRAVENOUS ONCE
Status: COMPLETED | OUTPATIENT
Start: 2020-12-01 | End: 2020-12-01

## 2020-12-01 RX ADMIN — GABAPENTIN 100 MG: 100 CAPSULE ORAL at 21:02

## 2020-12-01 RX ADMIN — Medication 1000 UNITS: at 08:40

## 2020-12-01 RX ADMIN — HEPARIN SODIUM 5000 UNITS: 5000 INJECTION INTRAVENOUS; SUBCUTANEOUS at 14:56

## 2020-12-01 RX ADMIN — ATORVASTATIN CALCIUM 40 MG: 40 TABLET, FILM COATED ORAL at 21:02

## 2020-12-01 RX ADMIN — FLUTICASONE PROPIONATE 1 SPRAY: 50 SPRAY, METERED NASAL at 08:40

## 2020-12-01 RX ADMIN — MEROPENEM 500 MG: 500 INJECTION, POWDER, FOR SOLUTION INTRAVENOUS at 21:02

## 2020-12-01 RX ADMIN — GABAPENTIN 100 MG: 100 CAPSULE ORAL at 08:40

## 2020-12-01 RX ADMIN — ASPIRIN 81 MG: 81 TABLET, FILM COATED ORAL at 08:40

## 2020-12-01 RX ADMIN — MEROPENEM 500 MG: 500 INJECTION, POWDER, FOR SOLUTION INTRAVENOUS at 08:36

## 2020-12-01 RX ADMIN — SODIUM CHLORIDE, PRESERVATIVE FREE 10 ML: 5 INJECTION INTRAVENOUS at 21:02

## 2020-12-01 RX ADMIN — ACETAMINOPHEN 650 MG: 325 TABLET ORAL at 12:09

## 2020-12-01 RX ADMIN — CLOPIDOGREL BISULFATE 75 MG: 75 TABLET ORAL at 08:40

## 2020-12-01 RX ADMIN — HEPARIN SODIUM 5000 UNITS: 5000 INJECTION INTRAVENOUS; SUBCUTANEOUS at 06:38

## 2020-12-01 RX ADMIN — METOPROLOL TARTRATE 5 MG: 5 INJECTION INTRAVENOUS at 01:37

## 2020-12-01 RX ADMIN — SODIUM CHLORIDE, PRESERVATIVE FREE 10 ML: 5 INJECTION INTRAVENOUS at 08:39

## 2020-12-01 RX ADMIN — PREDNISONE 10 MG: 5 TABLET ORAL at 08:40

## 2020-12-01 RX ADMIN — HEPARIN SODIUM 5000 UNITS: 5000 INJECTION INTRAVENOUS; SUBCUTANEOUS at 21:16

## 2020-12-01 RX ADMIN — VANCOMYCIN HYDROCHLORIDE 750 MG: 750 INJECTION, POWDER, LYOPHILIZED, FOR SOLUTION INTRAVENOUS at 12:01

## 2020-12-01 ASSESSMENT — PAIN SCALES - GENERAL
PAINLEVEL_OUTOF10: 4
PAINLEVEL_OUTOF10: 0
PAINLEVEL_OUTOF10: 2

## 2020-12-01 ASSESSMENT — PAIN DESCRIPTION - ORIENTATION
ORIENTATION: LEFT
ORIENTATION: LEFT

## 2020-12-01 ASSESSMENT — PAIN DESCRIPTION - FREQUENCY
FREQUENCY: INTERMITTENT
FREQUENCY: INTERMITTENT

## 2020-12-01 ASSESSMENT — PAIN DESCRIPTION - DESCRIPTORS
DESCRIPTORS: ACHING
DESCRIPTORS: ACHING

## 2020-12-01 ASSESSMENT — PAIN DESCRIPTION - ONSET
ONSET: SUDDEN
ONSET: SUDDEN

## 2020-12-01 ASSESSMENT — PAIN - FUNCTIONAL ASSESSMENT
PAIN_FUNCTIONAL_ASSESSMENT: PREVENTS OR INTERFERES WITH MANY ACTIVE NOT PASSIVE ACTIVITIES
PAIN_FUNCTIONAL_ASSESSMENT: PREVENTS OR INTERFERES WITH MANY ACTIVE NOT PASSIVE ACTIVITIES

## 2020-12-01 ASSESSMENT — PAIN DESCRIPTION - PAIN TYPE
TYPE: ACUTE PAIN
TYPE: ACUTE PAIN

## 2020-12-01 ASSESSMENT — PAIN DESCRIPTION - LOCATION
LOCATION: FOOT
LOCATION: FOOT

## 2020-12-01 ASSESSMENT — PAIN DESCRIPTION - PROGRESSION
CLINICAL_PROGRESSION: NOT CHANGED
CLINICAL_PROGRESSION: GRADUALLY IMPROVING

## 2020-12-01 NOTE — CARE COORDINATION
[facility]  Patient was active with [address] EFFECTIVE 10/21/20   PATIENT ENROLLED IN [place] WITH [facility] ON 10/22/20    Leann Lao LPN  CTN with  [facility],  [address] [place] [address], Fax [phone]

## 2020-12-01 NOTE — PROGRESS NOTES
Comprehensive Nutrition Assessment    Type and Reason for Visit:  Positive Nutrition Screen(MST3)    Nutrition Recommendations/Plan:   Continue General diet  Start Ensure HP BID. Nutrition Assessment:  +3 nutrition screen. Pt presented with palpitation and left foot infection. Pt recently D/C'd after being COVID+, which remains in isolation. Pt with increased needs r/t multiple wounds. Pt was eating well last week during admission. Noted wt +7# over hte past week. Will trial Ensure HP. Malnutrition Assessment:  Malnutrition Status:  No malnutrition      Estimated Daily Nutrient Needs:  Energy (kcal):  6034-3129 kcal (25-30 kcal/kg CBW)  Protein (g):   gm (1.4-1.8 gm/kg CBW)    Fluid (ml/day):  1 mL/kcal    Nutrition Related Findings:  BM 11/28; +1 BLE edema      Wounds:  Stage II, Pressure Injury, Surgical Incision       Current Nutrition Therapies:    DIET GENERAL; Anthropometric Measures:  · Height: 5' 4.02\" (162.6 cm)  · Current Body Weight: 136 lb (61.7 kg)   · Admission Body Weight: 136 lb (61.7 kg)    · Ideal Body Weight: 120 lbs; % Ideal Body Weight 113.3 %   · BMI: 23.3  · BMI Categories: Normal Weight (BMI 22.0 to 24.9) age over 72       Nutrition Diagnosis:   · Increased nutrient needs related to increase demand for energy/nutrients as evidenced by wounds      Nutrition Interventions:   Food and/or Nutrient Delivery:  Continue Current Diet, Start Oral Nutrition Supplement  Nutrition Education/Counseling:  No recommendation at this time   Coordination of Nutrition Care:  Continue to monitor while inpatient    Goals:  consume >50% of meals and supplement during admission       Nutrition Monitoring and Evaluation:   Food/Nutrient Intake Outcomes:  Food and Nutrient Intake, Supplement Intake  Physical Signs/Symptoms Outcomes:  Biochemical Data, Weight, Skin, Nutrition Focused Physical Findings     Discharge Planning:     Too soon to determine     Electronically signed by Jess Paniagua RD, ASAD on 12/1/20 at 1:28 PM EST    Contact: [phone]

## 2020-12-01 NOTE — PROGRESS NOTES
Patient is alert and oriented x4, up with walker x1 assist, call light within reach, bed/chair alarm on. AM meds complete, patient tolerated well. VSS and WDL. No s/s of distress, no further needs noted at this time.  Electronically signed by Dorota Roman RN on 12/1/2020 at 10:45 AM

## 2020-12-01 NOTE — PROGRESS NOTES
Occupational Therapy   Occupational Therapy Initial Assessment/Treatment  Date: 2020   Patient Name: James Borjas  MRN: [id number]     : 1933    Date of Service: 2020    Discharge Recommendations:  24 hour supervision or assist, S Level 1, Home with Home health OT  OT Equipment Recommendations  Other: TTB   James Borjas scored a 18/24 on the AM-PAC ADL Inpatient form. Current research shows that an AM-PAC score of 18 or greater is typically associated with a discharge to the patient's home setting. Based on the patient's AM-PAC score, and their current ADL deficits, it is recommended that the patient have 2-3 sessions per week of Occupational Therapy at d/c to increase the patient's independence. At this time, this patient demonstrates the endurance and safety to discharge home with  (home services) and a follow up treatment frequency of 2-3x/wk. Please see assessment section for further patient specific details. If patient discharges prior to next session this note will serve as a discharge summary. Please see below for the latest assessment towards goals. Assessment   Performance deficits / Impairments: Decreased functional mobility ; Decreased balance;Decreased ADL status; Decreased endurance;Decreased strength  Assessment: Patient is a 80yr old female admitted for L foot infection. Patient with previous toe amputation from [facility] with heel WB status to LLE using surgical shoe. Patient tolerated evaluation fairly well. Patient completed functional mobility and transfers in room with CGA using RW. Anticipate safe d/c home with ongoing 24hr assist from daughter. Patient would benefit from ongoing therapy in acute setting and upon d/c. Prognosis: Good;Fair  Decision Making: Medium Complexity  OT Education: OT Role;Transfer Training;Plan of Care;Energy Conservation;Precautions;Orientation; ADL Adaptive Strategies  Patient Education: WB status  REQUIRES OT FOLLOW UP: Yes  Activity for LB ADLs  Tone RUE  RUE Tone: Normotonic  Tone LUE  LUE Tone: Normotonic  Coordination  Movements Are Fluid And Coordinated: Yes     Bed mobility  Supine to Sit: Supervision(HOB elevated)  Sit to Supine: Unable to assess(pt up in chair)  Scooting: Supervision  Transfers  Sit to stand: Stand by assistance  Stand to sit: Stand by assistance     Cognition  Overall Cognitive Status: Exceptions  Arousal/Alertness: Appropriate responses to stimuli;Delayed responses to stimuli  Following Commands: Follows one step commands with increased time; Follows one step commands with repetition  Attention Span: Appears intact  Memory: Decreased short term memory;Decreased recall of biographical Information  Insights: Decreased awareness of deficits  Initiation: Requires cues for some  Sequencing: Does not require cues                 LUE AROM (degrees)  LUE AROM : WFL  Left Hand AROM (degrees)  Left Hand AROM: WFL  RUE AROM (degrees)  RUE AROM : WFL  Right Hand AROM (degrees)  Right Hand AROM: WFL                      Plan   Plan  Times per week: 3-5x/wk  Current Treatment Recommendations: Strengthening, Patient/Caregiver Education & Training, Endurance Training, Balance Training, Functional Mobility Training, Safety Education & Training, Positioning, Self-Care / ADL             AM-PAC Score        AM-[facility] Inpatient Daily Activity Raw Score: 18 (12/01/20 1009)  -PAC Inpatient ADL T-Scale Score : 38.66 (12/01/20 1009)  ADL Inpatient CMS 0-100% Score: 46.65 (12/01/20 1009)  ADL Inpatient CMS G-Code Modifier : CK (12/01/20 1009)    Goals  Short term goals  Time Frame for Short term goals: by d/c  Short term goal 1: Pt will complete toileting with min A  Short term goal 2: Pt will tolerate x4 mins of ADLs at sink while safely maintain WB status  Short term goal 3: Pt will complete functional transfers with supervision  Short term goal 4: Pt will complete LB dressing with supervision       Therapy Time   Individual Concurrent Group

## 2020-12-01 NOTE — CONSULTS
Clinical Pharmacy Note  Vancomycin Consult    Chilo Mckeon is a 80 y.o. female ordered Vancomycin for cellultitis/ R/O osteomyelitis; consult received from Dr. Tad James to manage therapy. Also receiving cefepime and meropenem. Patient Active Problem List   Diagnosis    History of malignant melanoma of eye    Glaucoma    Hypertension    CKD (chronic kidney disease)    Hyperlipidemia    Prediabetes    Hypoxia    Osteomyelitis (HCC)       Allergies:  Patient has no known allergies. Temp max:  Temp (24hrs), Av.5 °F (36.9 °C), Min:98.1 °F (36.7 °C), Max:98.8 °F (37.1 °C)      Recent Labs     20  0600 20  1050   WBC 10.3 8.3       Recent Labs     20  0600 20  1050   BUN 22* 24*   CREATININE 0.8 1.0       No intake or output data in the 24 hours ending 20 1904    Culture Results:  pending    Ht Readings from Last 1 Encounters:   20 5' 4\" (1.626 m)        Wt Readings from Last 1 Encounters:   20 136 lb 14.5 oz (62.1 kg)         CrCl cannot be calculated (Unknown ideal weight. ). Assessment/Plan:  Vancomycin 750 mg IV every 24 hours ordered. Regimen projects a trough level of 15-20 mg/L. Level ordered for 20 1200. Thank you for the consult.

## 2020-12-01 NOTE — PLAN OF CARE
Problem: Airway Clearance - Ineffective  Goal: Achieve or maintain patent airway  12/1/2020 1044 by Jefferson Cassidy RN  Outcome: Ongoing     Problem: Gas Exchange - Impaired  Goal: Absence of hypoxia  12/1/2020 1044 by Jefferson Cassidy RN  Outcome: Ongoing     Problem: Breathing Pattern - Ineffective  Goal: Ability to achieve and maintain a regular respiratory rate  12/1/2020 1044 by Jefferson Cassidy RN  Outcome: Ongoing     Problem:  Body Temperature -  Risk of, Imbalanced  Goal: Ability to maintain a body temperature within defined limits  12/1/2020 1044 by Jefferson Cassidy RN  Outcome: Ongoing     Problem: Isolation Precautions - Risk of Spread of Infection  Goal: Prevent transmission of infection  12/1/2020 1044 by Jefferson Cassidy RN  Outcome: Ongoing     Problem: Nutrition Deficits  Goal: Optimize nutrtional status  12/1/2020 1044 by Jefferson Cassidy RN  Outcome: Ongoing     Problem: Risk for Fluid Volume Deficit  Goal: Maintain normal heart rhythm  12/1/2020 1044 by Jefferson Cassidy RN  Outcome: Ongoing     Problem: Loneliness or Risk for Loneliness  Goal: Demonstrate positive use of time alone when socialization is not possible  12/1/2020 1044 by Jefferson Cassidy RN  Outcome: Ongoing     Problem: Fatigue  Goal: Verbalize increase energy and improved vitality  12/1/2020 1044 by Jefferson Cassidy RN  Outcome: Ongoing     Problem: Patient Education: Go to Patient Education Activity  Goal: Patient/Family Education  12/1/2020 1044 by Jeffesron Cassdiy RN  Outcome: Ongoing     Problem: Skin Integrity:  Goal: Will show no infection signs and symptoms  Description: Will show no infection signs and symptoms  12/1/2020 1044 by Jefferson Cassidy RN  Outcome: Ongoing     Problem: Falls - Risk of:  Goal: Will remain free from falls  Description: Will remain free from falls  12/1/2020 1044 by Jefferson Cassidy RN  Outcome: Ongoing

## 2020-12-01 NOTE — PROGRESS NOTES
Hospitalist Progress Note      PCP: Sindy Read MD    Date of Admission: 11/30/2020    Chief Complaint:     Hospital Course: The patient is a 80 y.o. female who presents to [facility] with palpitations. Patient states that she feels like her heart has been racing for the past 24 hours. She was just recently discharged from our facility on November 28 secondary to COVID-19 pneumonia. Patient was also just recently seen at [facility] for a left toe infection status post amputation. She was sent home with IV order ertapenem and was scheduled to complete her IV antibiotics soon. While in the emergency department it appears as if her left toe infection has worsened. X-ray showed diffuse soft tissue swelling in the forefoot along with possible underlying osteomyelitis of the second metatarsal.  Podiatry was consulted in the emergency department. I initially asked for the patient to be transferred back to the primary site of surgical intervention but due to COVID-19 restrictions and hospital limitations she will need to be admitted here to our facility. Patient was started on IV antibiotics along with having blood cultures drawn.     Patient denies any fever chills, nausea vomiting, diarrhea constipation, chest pain or shortness of breath. Subjective: Patient seen and examined. Podiatry consulted for possible surgical intervention. Physical and Occupational Therapy both agree that the patient would benefit from further outpatient care.   Continue with IV antibiotics      Medications:  Reviewed    Infusion Medications   Scheduled Medications    fluticasone  1 spray Each Nostril Daily    aspirin  81 mg Oral Daily    atorvastatin  40 mg Oral Nightly    brimonidine  1 drop Left Eye TID    clopidogrel  75 mg Oral Daily    colchicine  0.6 mg Oral Daily    gabapentin  100 mg Oral BID    latanoprost  1 drop Left Eye Nightly    predniSONE  10 mg Oral Daily    Vitamin D  1,000 Units Oral Daily    sodium chloride flush  10 mL Intravenous 2 times per day    vancomycin  750 mg Intravenous Q24H    vancomycin (VANCOCIN) intermittent dosing (placeholder)   Other RX Placeholder    heparin (porcine)  5,000 Units Subcutaneous 3 times per day    meropenem  500 mg Intravenous Q12H     PRN Meds: sodium chloride, HYDROcodone-acetaminophen, sodium chloride flush, acetaminophen **OR** acetaminophen, polyethylene glycol, promethazine **OR** ondansetron    No intake or output data in the 24 hours ending 12/01/20 0917    Physical Exam Performed:    /68   Pulse 98   Temp 98 °F (36.7 °C) (Oral)   Resp 16   Ht 5' 4.02\" (1.626 m)   Wt 136 lb 14.5 oz (62.1 kg)   SpO2 98%   BMI 23.49 kg/m²     General appearance: No apparent distress, appears stated age and cooperative. HEENT: Pupils equal, round, and reactive to light. Conjunctivae/corneas clear. Neck: Supple, with full range of motion. No jugular venous distention. Trachea midline. Respiratory:  Normal respiratory effort. Clear to auscultation  Cardiovascular: Regular rate and rhythm with normal S1/S2   Abdomen: Soft, non-tender, non-distended with normal bowel sounds. Musculoskeletal: No clubbing, cyanosis or edema bilaterally. Skin: Skin color, texture, turgor normal.  No rashes or lesions. Neurologic:  Neurovascularly intact without any focal sensory/motor deficits. Cranial nerves: II-XII intact, grossly non-focal.  Psychiatric: Alert and oriented, thought content appropriate, normal insight  Capillary Refill: Brisk,< 3 seconds   Peripheral Pulses: +2 palpable, equal bilaterally       Labs:   Recent Labs     11/30/20  1050 12/01/20  0617   WBC 8.3 5.7   HGB 11.9* 9.8*   HCT 37.4 29.7*   * 410     Recent Labs     11/30/20  1050 12/01/20  0617    143   K 3.7 3.7    109   CO2 26 26   BUN 24* 13   CREATININE 1.0 0.8   CALCIUM 9.6 8.5     No results for input(s): AST, ALT, BILIDIR, BILITOT, ALKPHOS in the last 72 hours.   No results for input(s): INR in the last 72 hours. Recent Labs     11/30/20  1050   TROPONINI 0.02*       Urinalysis:      Lab Results   Component Value Date    NITRU Negative 11/25/2020    WBCUA 2 11/25/2020    RBCUA 1 11/25/2020    BLOODU Negative 11/25/2020    SPECGRAV 1.014 11/25/2020    GLUCOSEU Negative 11/25/2020       Radiology:  XR FOOT LEFT (MIN 3 VIEWS)   Final Result   Diffuse soft tissue swelling predominating about the forefoot. Correlate   clinically cellulitis. Discrete erosions the 2nd metatarsal osteotomy site there is persistent   clinical concern for underlying osteomyelitis, MRI is more sensitive. Unchanged erosive findings at the 1st metatarsal head. Query underlying   erosive or inflammatory arthropathy such as gout. XR CHEST PORTABLE   Final Result   No significant interval change in perihilar and basilar opacity which can   reflect edema or in the appropriate clinical setting, pneumonia. Chronic   interstitial disease can appear similar.                  Assessment/Plan:    Left foot infection  Concern for osteomyelitis  Continue IV antibiotics  Podiatry consulted for possible surgical intervention and debridement  Elevated ESR and CRP  Patient with a polymicrobial infection with staph aureus along with enterobacteria cloaca at [facility]     COVID-19 pneumonia  Not requiring supplemental oxygen  Recently finished therapy   continue to monitor  With continue elevated D-dimer we will continue anticoagulation     Hyperlipidemia  Continue home statin    DVT Prophylaxis: Lovenox  Diet: DIET GENERAL;  Code Status: Full Code    PT/OT Eval Status: 2-3 sessions per week    Dispo -inpatient    Edwin Paz MD

## 2020-12-01 NOTE — PROGRESS NOTES
Physical Therapy    Facility/Department: [address] PROGRESSIVE CARE  Initial Assessment    NAME: Josie Arambula  : 1933  MRN: [id number]    Date of Service: 2020    Discharge Recommendations:  2-3 sessions per week, S Level 1, 24 hour supervision or assist   PT Equipment Recommendations  Equipment Needed: No  Josie Arambula scored a 19/24 on the AM-PAC short mobility form. Current research shows that an AM-PAC score of 18 or greater is typically associated with a discharge to the patient's home setting. Based on the patient's AM-PAC score and their current functional mobility deficits, it is recommended that the patient have 2-3 sessions per week of Physical Therapy at d/c to increase the patient's independence. At this time, this patient demonstrates the endurance and safety to discharge home with level 1 home PT  (home vs OP services) and a follow up treatment frequency of 2-3x/wk. Please see assessment section for further patient specific details. If patient discharges prior to next session this note will serve as a discharge summary. Please see below for the latest assessment towards goals. Assessment   Body structures, Functions, Activity limitations: Decreased functional mobility ; Decreased endurance;Decreased coordination;Decreased balance;Decreased safe awareness  Assessment: Pt 79 y/o with history of COVID positive now presenting with possible osteomyelitis of L foot needing further medical care at this time. Heel weight bearing LLE with surgical shoe at all times. Pt PLOF supervision using RW with dtr present. This date patient able to perform mobility tasks with RW needing SBA with cues for safety throughout. Pt with limited safety awareness and at time appears impulsive. A this time predict that patient would be able to return home with dtr supervision similar to PLOF unless changes in medical status following vascular recommendations.  Will continue to follow patient for reassessment throughout hospital stay. Recommend continued PT for safety training with mobility device, increase activity tolerance, and promote return to PLOF. Treatment Diagnosis: Decreased activity tolerance limiting ambulation  Prognosis: Good  Decision Making: Medium Complexity  History: see below  Exam: see below  Clinical Presentation: evolving  PT Education: Goals;PT Role;Plan of Care;Equipment;Precautions;Transfer Training; Adaptive Device Training;Functional Mobility Training;Disease Specific Education;Gait Training;General Safety  Barriers to Learning: none  REQUIRES PT FOLLOW UP: Yes  Activity Tolerance  Activity Tolerance: Treatment limited secondary to medical complications (free text); Patient Tolerated treatment well;Patient limited by endurance  Activity Tolerance: elevated HR       Patient Diagnosis(es): The primary encounter diagnosis was Left foot infection. Diagnoses of Cellulitis of left lower extremity and Subacute osteomyelitis of left foot (HCC) were also pertinent to this visit. has a past medical history of Chronic kidney disease, Gout, Hyperlipidemia, and Hypertension. has a past surgical history that includes Appendectomy; eye surgery (2004); and Foot surgery. Restrictions  Restrictions/Precautions  Restrictions/Precautions: Weight Bearing  Lower Extremity Weight Bearing Restrictions  Left Lower Extremity Weight Bearing: Partial Weight Bearing  Partial Weight Bearing Percentage Or Pounds: Pt with recent toe amp; per chart review pt heel WB with off loading shoe;  Position Activity Restriction  Other position/activity restrictions: up with A, COVID 19  Vision/Hearing  Vision: Impaired(prosthetic R eye)  Vision Exceptions: Wears glasses at all times  Hearing: Exceptions to [facility]  Hearing Exceptions: Hard of hearing/hearing concerns     Subjective  General  Chart Reviewed: Yes  Additional Pertinent Hx: \"87 y.o. female who presents to [facility] with palpitations.   Patient states that she feels like her heart has been racing for the past 24 hours. She was just recently discharged from our facility on November 28 secondary to COVID-19 pneumonia. Patient was also just recently seen at [facility] for a left toe infection status post amputation. She was sent home with IV order ertapenem and was scheduled to complete her IV antibiotics soon. While in the emergency department it appears as if her left toe infection has worsened. X-ray showed diffuse soft tissue swelling in the forefoot along with possible underlying osteomyelitis of the second metatarsal.  Podiatry was consulted in the emergency department. I initially asked for the patient to be transferred back to the primary site of surgical intervention but due to COVID-19 restrictions and hospital limitations she will need to be admitted here to our facility. Patient was started on IV antibiotics along with having blood cultures drawn\" (Dr. Maggie Theodore 11/30/2020)  Response To Previous Treatment: Not applicable  Family / Caregiver Present: No  Referring Practitioner: Dr. Maggie Theodore  Referral Date : 11/30/20  Diagnosis: Osteomyelitis L foot  Subjective  Subjective: Pt sitting in recliner upon arrival, without complaints.  Pt agreeable to PT evaluation at this time  Pain Screening  Patient Currently in Pain: Denies          Orientation  Orientation  Overall Orientation Status: Within Normal Limits  Social/Functional History  Social/Functional History  Lives With: Daughter  Type of Home: House  Home Layout: One level  Home Access: Stairs to enter without rails  Entrance Stairs - Number of Steps: 1 PÉREZ  Bathroom Shower/Tub: Tub/Shower unit  Bathroom Toilet: Standard(with toilet raiser)  Bathroom Equipment: Grab bars in shower, Shower chair  Bathroom Accessibility: Walker accessible  Home Equipment: Rolling walker  Receives Help From: Family  ADL Assistance: Needs assistance  Bath: Maximum assistance(Daughter assists with bathing)  Dressing: Modified independent  Toileting: Needs assistance(daughter assists with BM hygiene)  Homemaking Assistance: Needs assistance(Daughter completes all cooking, cleaning, and laundry)  Homemaking Responsibilities: No  Ambulation Assistance: Needs assistance(pt reports close supervision for mobility using RW)  Transfer Assistance: Needs assistance(pt reports close supervision using RW)  Active : No  Patient's  Info: Daughter completes all driving  Additional Comments: Denies any falls at home  Cognition   Cognition  Overall Cognitive Status: WFL    Objective     Observation/Palpation  Posture: Good  Observation: surgical shoes with heel weight bearing and sock donned to L foot    AROM RLE (degrees)  RLE AROM: WFL  AROM LLE (degrees)  LLE AROM : WFL  Strength RLE  Strength RLE: WFL  Strength LLE  Strength LLE: WFL  Tone RLE  RLE Tone: Normotonic  Tone LLE  LLE Tone: Normotonic  Sensation  Overall Sensation Status: WFL(denies numbenss and tingling to BLEs)  Bed mobility  Supine to Sit: Unable to assess  Sit to Supine: Unable to assess  Comment: OOB throughout treatment session, denies any difficulty  Transfers  Sit to Stand: Stand by assistance(VC for hand placement/proper sequencing, heel weighting with shoe donned)  Stand to sit: Stand by assistance(very poor eccentric control to chair needing cues for safety)  Ambulation  Ambulation?: Yes  Ambulation 1  Surface: level tile  Device: Rolling Walker  Assistance: Stand by assistance  Quality of Gait: pushing RW too far forward, flexed trunk, quick performing, maintains heel weight bearing with surgical shoe donned  Gait Deviations: Decreased step length;Deviated path  Distance: 36' with multiple turns  Comments: HR elevated 140s with SOB but SPO2% on room air 96%, at rest returned HR to 110 bpm and SPO2% 98% quickly  Stairs/Curb  Stairs?: No     Balance  Posture: Fair  Sitting - Static: Good  Sitting - Dynamic: Good  Standing - Static: Good  Standing - Dynamic: Good;-  Comments: Sitting recliner mod I for balance, standing SBA with cues for safety using RW        Plan   Plan  Times per week: 3-5X  Current Treatment Recommendations: Strengthening, Balance Training, Endurance Training, Neuromuscular Re-education, Safety Education & Training, Functional Mobility Training, Gait Training, Transfer Training, Equipment Evaluation, Education, & procurement, Patient/Caregiver Education & Training  Safety Devices  Type of devices: All fall risk precautions in place, Call light within reach, Chair alarm in place, Gait belt, Patient at risk for falls, Left in chair  Restraints  Initially in place: No      AM-PAC Score  AM-PAC Inpatient Mobility Raw Score : 19 (12/01/20 1116)  AM-PAC Inpatient T-Scale Score : 45.44 (12/01/20 1116)  Mobility Inpatient CMS 0-100% Score: 41.77 (12/01/20 1116)  Mobility Inpatient CMS G-Code Modifier : CK (12/01/20 1116)          Goals  Short term goals  Time Frame for Short term goals: prior to discharge  Short term goal 1: bed mobility mod I  Short term goal 2: transfer sit <-> stand supervision maintaining heel weight bearing  Short term goal 3: ambulate with RW 48' with RW heel WB supervision  Long term goals  Time Frame for Long term goals : STG=LTG  Patient Goals   Patient goals : \"return home, independence with walking\"       Therapy Time   Individual Concurrent Group Co-treatment   Time In 1050         Time Out 1115         Minutes 25         Timed Code Treatment Minutes: 505 Joni Peralta PT     Patient is in droplet plus isolation. PT Dons in [place], Gloves, N95 and face shield while in patient's room to prevent the spread of infection.     Olivia Lynn PT, DPT [id number] 12/01/20 11:17 AM

## 2020-12-02 ENCOUNTER — APPOINTMENT (OUTPATIENT)
Dept: MRI IMAGING | Age: 85
DRG: 602 | End: 2020-12-02
Payer: MEDICARE

## 2020-12-02 ENCOUNTER — APPOINTMENT (OUTPATIENT)
Dept: GENERAL RADIOLOGY | Age: 85
DRG: 602 | End: 2020-12-02
Payer: MEDICARE

## 2020-12-02 LAB
APTT: 31.6 SEC (ref 24.2–36.2)
D DIMER: 1842 NG/ML DDU (ref 0–229)
EKG ATRIAL RATE: 117 BPM
EKG DIAGNOSIS: NORMAL
EKG P AXIS: 43 DEGREES
EKG P-R INTERVAL: 134 MS
EKG Q-T INTERVAL: 336 MS
EKG QRS DURATION: 76 MS
EKG QTC CALCULATION (BAZETT): 468 MS
EKG R AXIS: -5 DEGREES
EKG T AXIS: 38 DEGREES
EKG VENTRICULAR RATE: 117 BPM
VANCOMYCIN TROUGH: 10.9 UG/ML (ref 10–20)

## 2020-12-02 PROCEDURE — 70260 X-RAY EXAM OF SKULL: CPT

## 2020-12-02 PROCEDURE — 94761 N-INVAS EAR/PLS OXIMETRY MLT: CPT

## 2020-12-02 PROCEDURE — 97110 THERAPEUTIC EXERCISES: CPT

## 2020-12-02 PROCEDURE — 80202 ASSAY OF VANCOMYCIN: CPT

## 2020-12-02 PROCEDURE — 85730 THROMBOPLASTIN TIME PARTIAL: CPT

## 2020-12-02 PROCEDURE — 1200000000 HC SEMI PRIVATE

## 2020-12-02 PROCEDURE — 2580000003 HC RX 258: Performed by: INTERNAL MEDICINE

## 2020-12-02 PROCEDURE — 6360000002 HC RX W HCPCS: Performed by: INTERNAL MEDICINE

## 2020-12-02 PROCEDURE — 6370000000 HC RX 637 (ALT 250 FOR IP): Performed by: INTERNAL MEDICINE

## 2020-12-02 PROCEDURE — 93010 ELECTROCARDIOGRAM REPORT: CPT | Performed by: INTERNAL MEDICINE

## 2020-12-02 PROCEDURE — 97530 THERAPEUTIC ACTIVITIES: CPT

## 2020-12-02 PROCEDURE — 85379 FIBRIN DEGRADATION QUANT: CPT

## 2020-12-02 PROCEDURE — 36415 COLL VENOUS BLD VENIPUNCTURE: CPT

## 2020-12-02 PROCEDURE — 97116 GAIT TRAINING THERAPY: CPT

## 2020-12-02 PROCEDURE — 73718 MRI LOWER EXTREMITY W/O DYE: CPT

## 2020-12-02 PROCEDURE — 93005 ELECTROCARDIOGRAM TRACING: CPT | Performed by: INTERNAL MEDICINE

## 2020-12-02 RX ORDER — CALCIUM CARBONATE 200(500)MG
500 TABLET,CHEWABLE ORAL 2 TIMES DAILY PRN
Status: DISCONTINUED | OUTPATIENT
Start: 2020-12-02 | End: 2020-12-04 | Stop reason: HOSPADM

## 2020-12-02 RX ADMIN — PREDNISONE 10 MG: 5 TABLET ORAL at 08:56

## 2020-12-02 RX ADMIN — ASPIRIN 81 MG: 81 TABLET, FILM COATED ORAL at 08:55

## 2020-12-02 RX ADMIN — BRIMONIDINE TARTRATE 1 DROP: 2 SOLUTION OPHTHALMIC at 20:33

## 2020-12-02 RX ADMIN — SODIUM CHLORIDE, PRESERVATIVE FREE 10 ML: 5 INJECTION INTRAVENOUS at 08:55

## 2020-12-02 RX ADMIN — CLOPIDOGREL BISULFATE 75 MG: 75 TABLET ORAL at 08:58

## 2020-12-02 RX ADMIN — VANCOMYCIN HYDROCHLORIDE 750 MG: 750 INJECTION, POWDER, LYOPHILIZED, FOR SOLUTION INTRAVENOUS at 10:50

## 2020-12-02 RX ADMIN — FLUTICASONE PROPIONATE 1 SPRAY: 50 SPRAY, METERED NASAL at 08:57

## 2020-12-02 RX ADMIN — GABAPENTIN 100 MG: 100 CAPSULE ORAL at 08:56

## 2020-12-02 RX ADMIN — GABAPENTIN 100 MG: 100 CAPSULE ORAL at 20:33

## 2020-12-02 RX ADMIN — MEROPENEM 500 MG: 500 INJECTION, POWDER, FOR SOLUTION INTRAVENOUS at 08:56

## 2020-12-02 RX ADMIN — HEPARIN SODIUM 5000 UNITS: 5000 INJECTION INTRAVENOUS; SUBCUTANEOUS at 05:44

## 2020-12-02 RX ADMIN — ANTACID TABLETS 500 MG: 500 TABLET, CHEWABLE ORAL at 17:07

## 2020-12-02 RX ADMIN — BRIMONIDINE TARTRATE 1 DROP: 2 SOLUTION OPHTHALMIC at 14:24

## 2020-12-02 RX ADMIN — HEPARIN SODIUM 5000 UNITS: 5000 INJECTION INTRAVENOUS; SUBCUTANEOUS at 12:45

## 2020-12-02 RX ADMIN — ATORVASTATIN CALCIUM 40 MG: 40 TABLET, FILM COATED ORAL at 20:33

## 2020-12-02 RX ADMIN — Medication 1000 UNITS: at 08:56

## 2020-12-02 RX ADMIN — SODIUM CHLORIDE, PRESERVATIVE FREE 10 ML: 5 INJECTION INTRAVENOUS at 20:33

## 2020-12-02 RX ADMIN — MEROPENEM 500 MG: 500 INJECTION, POWDER, FOR SOLUTION INTRAVENOUS at 20:32

## 2020-12-02 RX ADMIN — HEPARIN SODIUM 5000 UNITS: 5000 INJECTION INTRAVENOUS; SUBCUTANEOUS at 22:12

## 2020-12-02 RX ADMIN — COLCHICINE 0.6 MG: 0.6 TABLET, FILM COATED ORAL at 08:56

## 2020-12-02 ASSESSMENT — PAIN SCALES - GENERAL
PAINLEVEL_OUTOF10: 0
PAINLEVEL_OUTOF10: 0
PAINLEVEL_OUTOF10: 4
PAINLEVEL_OUTOF10: 0

## 2020-12-02 NOTE — PROGRESS NOTES
Physical Therapy  Facility/Department: [address] PROGRESSIVE CARE  Daily Treatment Note  NAME: Ilana Valles  : 1933  MRN: [id number]    Date of Service: 2020    Discharge Recommendations:  Continue to assess pending progress   PT Equipment Recommendations  Equipment Needed: No    Assessment   Body structures, Functions, Activity limitations: Decreased functional mobility ; Decreased safe awareness;Decreased endurance  Assessment: 79 y/o female admit 2020  with history of COVID positive now presenting with possible osteomyelitis of L foot needing further medical care at this time. Heel weight bearing LLE with surgical shoe at all times. Pt PLOF supervision using RW with dtr present. This date patient able to perform mobility tasks with RW needing SBA with cues for safety throughout. Pt with limited safety awareness and at time appears impulsive. A this time predict that patient would be able to return home with dtr supervision similar to PLOF unless changes in medical status following vascular recommendations. Will continue to follow patient for reassessment throughout hospital stay. Recommend continued PT for safety training with mobility device, increase activity tolerance, and promote return to PLOF. Treatment Diagnosis: Decreased activity tolerance limiting ambulation  Prognosis: Good  Decision Making: Medium Complexity  Patient Education: Safe use of Walker. REQUIRES PT FOLLOW UP: Yes  Activity Tolerance  Activity Tolerance: Patient Tolerated treatment well  Activity Tolerance: Elevated HR. Patient Diagnosis(es): The primary encounter diagnosis was Left foot infection. Diagnoses of Cellulitis of left lower extremity and Subacute osteomyelitis of left foot (HCC) were also pertinent to this visit. has a past medical history of Chronic kidney disease, Gout, Hyperlipidemia, and Hypertension.    has a past surgical history that includes Appendectomy; eye surgery (); and Foot surgery. Restrictions  Restrictions/Precautions  Restrictions/Precautions: Weight Bearing  Lower Extremity Weight Bearing Restrictions  Left Lower Extremity Weight Bearing: Partial Weight Bearing  Partial Weight Bearing Percentage Or Pounds: Pt with recent toe amp; per chart review pt heel WB with off loading shoe;  Position Activity Restriction  Other position/activity restrictions: Droplet Plus : COVID +. Subjective   General  Chart Reviewed: Yes  Additional Pertinent Hx: \"87 y.o. female who presents to [facility] with palpitations. Patient states that she feels like her heart has been racing for the past 24 hours. She was just recently discharged from our facility on November 28 secondary to COVID-19 pneumonia. Patient was also just recently seen at [facility] for a left toe infection status post amputation. She was sent home with IV order ertapenem and was scheduled to complete her IV antibiotics soon. While in the emergency department it appears as if her left toe infection has worsened. X-ray showed diffuse soft tissue swelling in the forefoot along with possible underlying osteomyelitis of the second metatarsal.  Podiatry was consulted in the emergency department. I initially asked for the patient to be transferred back to the primary site of surgical intervention but due to COVID-19 restrictions and hospital limitations she will need to be admitted here to our facility. Patient was started on IV antibiotics along with having blood cultures drawn\" (Dr. Scanlon Sat 11/30/2020)  Response To Previous Treatment: Patient with no complaints from previous session. Family / Caregiver Present: No  Referring Practitioner: Dr. Scanlon Sat  Subjective  Subjective: Pt agreeable to PT Rx. Orientation  Orientation  Overall Orientation Status: Within Functional Limits  Cognition      Objective      Transfers  Sit to Stand: Stand by assistance(With Walker.   Cues for safe hand placement.)  Stand to sit: Stand by assistance(With Walker. Cues for safe hand placement.)  Ambulation  Ambulation?: Yes  Ambulation 1  Surface: level tile  Device: Rolling Walker  Distance: Pt amb 50' with Rolling Walker SBA/Slight CGA. Flexed posture, diminished step length/clearance. Cues to remain safe/close distance to walker. Comments: HR cont elevated 140's with short distances at bedside. AM-PAC Score  AM-PAC Inpatient Mobility Raw Score : 19 (12/02/20 1227)  AM-PAC Inpatient T-Scale Score : 45.44 (12/02/20 1227)  Mobility Inpatient CMS 0-100% Score: 41.77 (12/02/20 1227)  Mobility Inpatient CMS G-Code Modifier : CK (12/02/20 1227)          Goals  Short term goals  Time Frame for Short term goals: prior to discharge  Short term goal 1: bed mobility mod I  Short term goal 2: transfer sit <-> stand supervision maintaining heel weight bearing  Short term goal 3: ambulate with RW 48' with RW heel WB supervision  Patient Goals   Patient goals : \"return home, independence with walking\"    Plan    Plan  Times per week: 3-5x week while in acute care setting.   Current Treatment Recommendations: Strengthening, Functional Mobility Training, Transfer Training, Gait Training, Safety Education & Training, Patient/Caregiver Education & Training  Safety Devices  Type of devices: Call light within reach, Chair alarm in place, Left in chair, Nurse notified  Restraints  Initially in place: No     Therapy Time   Individual Concurrent Group Co-treatment   Time In 1120         Time Out 1145         Minutes [address] [place] [address], PT

## 2020-12-02 NOTE — CONSULTS
Department of Podiatric Surgery  History and Physical        CHIEF COMPLAINT:    Chief Complaint   Patient presents with    Tachycardia     dx with covid 2-3 weeks ago, had surgery on her right foot in the last few weeks        Reason for Consultation:  Foot infection    History Obtained From:  electronic medical record     HISTORY OF PRESENT ILLNESS:       The patient is a 80 y.o. female who presents with a foot infection. She was seen by Dr. Murtaza Ott on 11/28/2020 for a similar concern and was determined not to have any acute infection at that time, but had a normal post operative appearance. The patient bounced back and was admitted through the ED on 11/30/2020 with a foot infection. The patient had several surgeries for limb salvage in [facility] last month. She tested positive for Covid19 on 11/14/2020 while at [facility]. She has test positive again here at [facility]. Dr. Asha Craven placed stents in the SFA and Popliteal arteries on 11/4/2020 and she has a scheduled follow up with him on 12/7/2020. The patient most recently had a second procedure for closure of the wounds and application of a skin graft substitute which was intact and appeared healthy on prior admission. 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 The pictures from 11/25/2020 demonstrate some evidence of ischemic digits, but due to the patient's Covid+ nature I am unable to see her this evening because I do not have the proper PPE. I will try and get N95 or equivalent equipment tomorrow. These pictures are from 11/25/2020 demonstrating some progressive ischemia of the great toe and the 3rd toe. Dr. Murtaza Ott documented on 11/28/2020 that there were changes consistent with dry gangrene of the foot.  If there is greater concern for ischemia it is likely that she would benefit from a vascular consultation here as she is unlikely to make her outpatient appointment at [facility] on 12/7/2020. The patient's labs, x-rays, and overall health status do not warrant an emergent surgery at this time. Without physical exam of the foot at this time I cannot make a determination that she would not benefit from surgery, but based on information from RN this evening and the review of the chart this appears to be gradual worsening of an otherwise chronic condition that will likely lead to a more proximal foot amputation. Past Medical History:        Diagnosis Date    Chronic kidney disease     Gout     Hyperlipidemia     Hypertension      Past Surgical History:        Procedure Laterality Date    APPENDECTOMY      EYE SURGERY  2004    XRT plaque OD for melanoma    FOOT SURGERY      left                 FAMILY HISTORY    Family History   Problem Relation Age of Onset    Cancer Father         colon       SOCIAL HISTORY    Social History     Tobacco Use    Smoking status: Never Smoker    Smokeless tobacco: Never Used   Substance Use Topics    Alcohol use: No     Alcohol/week: 0.0 standard drinks    Drug use: No       ALLERGIES    No Known Allergies    MEDICATIONS    No current facility-administered medications on file prior to encounter. Current Outpatient Medications on File Prior to Encounter   Medication Sig Dispense Refill    HYDROcodone-acetaminophen (NORCO) 5-325 MG per tablet Take 1 tablet by mouth every 8 hours as needed for Pain.  ASPIRIN LOW DOSE 81 MG EC tablet Take 81 mg by mouth daily      clopidogrel (PLAVIX) 75 MG tablet Take 75 mg by mouth daily      colchicine (COLCRYS) 0.6 MG tablet Take 0.6 mg by mouth daily      gabapentin (NEURONTIN) 100 MG capsule Take 100 mg by mouth 2 times daily.       atorvastatin (LIPITOR) 40 MG tablet Take 40 mg by mouth daily      sodium chloride 0.9 % SOLN 50 mL with ertapenem 1 GM SOLR 1 g Infuse 1 g intravenously every 24 hours X 10 days (started 11/23)      predniSONE (DELTASONE) 10 MG tablet Take 10 mg by mouth daily      a vascular consultation as the natural history of this problem is critical limb ischemia with tissues damage    Osteomyelitis is less of a concern than the peripheral vascular disease    Labs, vitals, imaging available and RN description of wound does not indicate an acute or rapid spreading foot infection. Do not anticipate emergent surgical intervention. Continue prophylactic antibiotics. I will see patient tomorrow provided I can get proper PPE for Covid19 precaution. Thanks for the opportunity to participate in this patient's care.      Aime Rodriguez DPM  [facility]  Pager: [phone]  Office: [phone]  Fax: [phone]

## 2020-12-02 NOTE — ACP (ADVANCE CARE PLANNING)
Advance Care Planning     Advance Care Planning Activator (Inpatient)  Conversation Note      Date of ACP Conversation: 12/02/2020    Conversation Conducted with: Patient with Decision Making Capacity    ACP Activator: C/ Mary Kate 29 Decision Maker:     Current Designated Health Care Decision Maker:   Primary Decision Maker: Kelbyrosmery Dawson - Child - [phone]    Primary Decision Maker: Gina Echols Child - [phone]    Care Preferences    Ventilation: \"If you were in your present state of health and suddenly became very ill and were unable to breathe on your own, what would your preference be about the use of a ventilator (breathing machine) if it were available to you? \"      Would the patient desire the use of ventilator (breathing machine)?: Unsure     \"If your health worsens and it becomes clear that your chance of recovery is unlikely, what would your preference be about the use of a ventilator (breathing machine) if it were available to you? \"     Would the patient desire the use of ventilator (breathing machine)?: Unsure       Resuscitation  \"CPR works best to restart the heart when there is a sudden event, like a heart attack, in someone who is otherwise healthy. Unfortunately, CPR does not typically restart the heart for people who have serious health conditions or who are very sick. \"    \"In the event your heart stopped as a result of an underlying serious health condition, would you want attempts to be made to restart your heart (answer \"yes\" for attempt to resuscitate) or would you prefer a natural death (answer \"no\" for do not attempt to resuscitate)? \" Unsure      [] Yes   [x] No   Educated Patient / Frieda Weaver regarding differences between Advance Directives and portable DNR orders.     Length of ACP Conversation in minutes:  10    Conversation Outcomes:  [x] ACP discussion completed  [] Existing advance directive reviewed with patient; no changes to patient's previously recorded wishes  [] New Advance Directive completed  [] Portable Do Not Rescitate prepared for Provider review and signature  [] POLST/POST/MOLST/MOST prepared for Provider review and signature      Follow-up plan:    [] Schedule follow-up conversation to continue planning  [] Referred individual to Provider for additional questions/concerns   [] Advised patient/agent/surrogate to review completed ACP document and update if needed with changes in condition, patient preferences or care setting    [x] This note routed to one or more involved healthcare providers       Electronically signed by MILTON Lind, RAI on 12/2/2020 at 9:03 AM

## 2020-12-02 NOTE — CARE COORDINATION
INITIAL CASE MANAGEMENT ASSESSMENT    Reviewed chart, unable to meet with patient due to isolation status. Call to patient's room, spoke with patient over the phone to assess possible discharge needs. Explained Case Management role/services. Living Situation: Patient confirmed her address. Patient lives with her daughter and son-in-law in a house with 2 steps to enter. ADLs: Independent prior to admission. DME: Patient has a walker, but reports she does not use it. PT/OT Recs: 24 hour supervision or assist, S Level 1, Home with Home health OT  OT Equipment Recommendations     Active Services: [employer]     Transportation: Patient does not drive. Family to transport at discharge. Medications: [employer] in [email or web address]. Patient reported no issues obtaining medications. PCP: Vandana Lemos MD      HD/PD: N/A    PLAN/COMMENTS: Patient plans to return home with [facility]. ACP completed with patient. Patient requested readmission document be completed with her daughter. CM provided contact information for patient or family to call with any questions. CM will follow and assist as needed.     Electronically signed by MILTON Ma, RAI on 12/2/2020 at 9:04 AM

## 2020-12-02 NOTE — PROGRESS NOTES
Hospitalist Progress Note      PCP: Yojana Archer MD    Date of Admission: 11/30/2020    Chief Complaint:     Hospital Course: The patient is a 80 y.o. female who presents to [facility] with palpitations. Patient states that she feels like her heart has been racing for the past 24 hours. She was just recently discharged from our facility on November 28 secondary to COVID-19 pneumonia. Patient was also just recently seen at [facility] for a left toe infection status post amputation. She was sent home with IV order ertapenem and was scheduled to complete her IV antibiotics soon. While in the emergency department it appears as if her left toe infection has worsened. X-ray showed diffuse soft tissue swelling in the forefoot along with possible underlying osteomyelitis of the second metatarsal.  Podiatry was consulted in the emergency department. I initially asked for the patient to be transferred back to the primary site of surgical intervention but due to COVID-19 restrictions and hospital limitations she will need to be admitted here to our facility. Patient was started on IV antibiotics along with having blood cultures drawn.     Patient denies any fever chills, nausea vomiting, diarrhea constipation, chest pain or shortness of breath. Subjective: Patient seen and examined. Start wet to dry dressings. Wound care consult. Podiatry to stop by today. MRI ordered, likely won't occur until later tonight due to Ve[place]. Patient warned she may need amputation.      Medications:  Reviewed    Infusion Medications   Scheduled Medications    fluticasone  1 spray Each Nostril Daily    aspirin  81 mg Oral Daily    atorvastatin  40 mg Oral Nightly    brimonidine  1 drop Left Eye TID    clopidogrel  75 mg Oral Daily    colchicine  0.6 mg Oral Daily    gabapentin  100 mg Oral BID    latanoprost  1 drop Left Eye Nightly    predniSONE  10 mg Oral Daily    Vitamin D  1,000 Units Oral Daily    sodium chloride flush  10 mL Intravenous 2 times per day    vancomycin  750 mg Intravenous Q24H    vancomycin (VANCOCIN) intermittent dosing (placeholder)   Other RX Placeholder    heparin (porcine)  5,000 Units Subcutaneous 3 times per day    meropenem  500 mg Intravenous Q12H     PRN Meds: sodium chloride, HYDROcodone-acetaminophen, sodium chloride flush, acetaminophen **OR** acetaminophen, polyethylene glycol, promethazine **OR** ondansetron      Intake/Output Summary (Last 24 hours) at 12/2/2020 0915  Last data filed at 12/1/2020 1400  Gross per 24 hour   Intake 480 ml   Output --   Net 480 ml       Physical Exam Performed:    BP (!) 156/79   Pulse 100   Temp 97.8 °F (36.6 °C) (Oral)   Resp 16   Ht 5' 4.02\" (1.626 m)   Wt 129 lb 6.6 oz (58.7 kg)   SpO2 97%   BMI 22.20 kg/m²     General appearance: No apparent distress, appears stated age and cooperative. HEENT: Pupils equal, round, and reactive to light. Conjunctivae/corneas clear. Neck: Supple, with full range of motion. No jugular venous distention. Trachea midline. Respiratory:  Normal respiratory effort. Clear to auscultation  Cardiovascular: Regular rate and rhythm with normal S1/S2   Abdomen: Soft, non-tender, non-distended with normal bowel sounds. Musculoskeletal: No clubbing, cyanosis or edema bilaterally. Skin:left foot plantar black ulcer, big toe and 3rd toe necrosis  Neurologic:  Neurovascularly intact without any focal sensory/motor deficits.  Cranial nerves: II-XII intact, grossly non-focal.  Psychiatric: Alert and oriented, thought content appropriate, normal insight  Capillary Refill: Brisk,< 3 seconds   Peripheral Pulses: +2 palpable, equal bilaterally       Labs:   Recent Labs     11/30/20  1050 12/01/20  0617   WBC 8.3 5.7   HGB 11.9* 9.8*   HCT 37.4 29.7*   * 410     Recent Labs     11/30/20  1050 12/01/20  0617    143   K 3.7 3.7    109   CO2 26 26   BUN 24* 13   CREATININE 1.0 0.8   CALCIUM 9.6 8.5     No results for input(s): AST, ALT, BILIDIR, BILITOT, ALKPHOS in the last 72 hours. No results for input(s): INR in the last 72 hours. Recent Labs     11/30/20  1050   TROPONINI 0.02*       Urinalysis:      Lab Results   Component Value Date    NITRU Negative 11/25/2020    WBCUA 2 11/25/2020    RBCUA 1 11/25/2020    BLOODU Negative 11/25/2020    SPECGRAV 1.014 11/25/2020    GLUCOSEU Negative 11/25/2020       Radiology:  XR FOOT LEFT (MIN 3 VIEWS)   Final Result   Diffuse soft tissue swelling predominating about the forefoot. Correlate   clinically cellulitis. Discrete erosions the 2nd metatarsal osteotomy site there is persistent   clinical concern for underlying osteomyelitis, MRI is more sensitive. Unchanged erosive findings at the 1st metatarsal head. Query underlying   erosive or inflammatory arthropathy such as gout. XR CHEST PORTABLE   Final Result   No significant interval change in perihilar and basilar opacity which can   reflect edema or in the appropriate clinical setting, pneumonia. Chronic   interstitial disease can appear similar.                  Assessment/Plan:    Left foot infection  Concern for osteomyelitis, MRI ordered  Continue IV antibiotics  Podiatry consulted for possible surgical intervention and debridement  Elevated ESR and CRP  Patient with a polymicrobial infection with staph aureus along with enterobacteria cloaca at [facility]     COVID-19 pneumonia  Not requiring supplemental oxygen  Recently finished therapy   continue to monitor  With continue elevated D-dimer we will continue anticoagulation     Hyperlipidemia  Continue home statin    DVT Prophylaxis: Lovenox  Diet: DIET GENERAL;  Dietary Nutrition Supplements: Low Calorie High Protein Supplement  Code Status: Full Code    PT/OT Eval Status: 2-3 sessions per week    Dispo -inpatient    Lyndsey Childs MD

## 2020-12-02 NOTE — PROGRESS NOTES
Occupational Therapy  Facility/Department: [address] PROGRESSIVE CARE  Daily Treatment Note  NAME: Kelli Marquez  : 1933  MRN: [id number]    Date of Service: 2020    Discharge Recommendations:  24 hour supervision or assist, S Level 1, Home with Home health OT  OT Equipment Recommendations  Other: TTB  Kelli Marquez scored a 19/24 on the AM-PAC ADL Inpatient form. Current research shows that an AM-PAC score of 18 or greater is typically associated with a discharge to the patient's home setting. Based on the patient's AM-PAC score, and their current ADL deficits, it is recommended that the patient have 2-3 sessions per week of Occupational Therapy at d/c to increase the patient's independence. At this time, this patient demonstrates the endurance and safety to discharge home with home OT and a follow up treatment frequency of 2-3x/wk. Please see assessment section for further patient specific details. If patient discharges prior to next session this note will serve as a discharge summary. Please see below for the latest assessment towards goals. Assessment   Performance deficits / Impairments: Decreased functional mobility ; Decreased balance;Decreased ADL status; Decreased endurance;Decreased strength  Assessment: Pt tolerated tx session well. Pt demo'd improvement in fxl transfers and mobility, able to complete SBA using RW. Pt maintains heel WB status with min cues. Pt required assist to don footwear, which dtr assists with at home. Pt tolerated UE therex well, though HR up to 135 with activity and required rest breaks for energy conservation. Continue to anticipate safe d/c to home with 24/7 supervision as prior.   Prognosis: Good;Fair  OT Education: OT Role;Transfer Training;Plan of Care;Energy Conservation;Precautions  Patient Education: [employer] status  REQUIRES OT FOLLOW UP: Yes  Activity Tolerance  Activity Tolerance: Patient Tolerated treatment well;Patient limited by fatigue  Activity Tolerance: O2 reading difficult to obtain, initially down to mid-80s with mobility but rebounded quickly to 97% on room air. HR up to 135 during mobility  Safety Devices  Safety Devices in place: Yes  Type of devices: Left in chair;Chair alarm in place;Call light within reach; All fall risk precautions in place; Patient at risk for falls         Patient Diagnosis(es): The primary encounter diagnosis was Left foot infection. Diagnoses of Cellulitis of left lower extremity and Subacute osteomyelitis of left foot (HCC) were also pertinent to this visit. has a past medical history of Chronic kidney disease, Gout, Hyperlipidemia, and Hypertension. has a past surgical history that includes Appendectomy; eye surgery (2004); and Foot surgery. Restrictions  Restrictions/Precautions  Restrictions/Precautions: Weight Bearing  Lower Extremity Weight Bearing Restrictions  Left Lower Extremity Weight Bearing: Partial Weight Bearing  Partial Weight Bearing Percentage Or Pounds: Pt with recent toe amp; per chart review pt heel WB with off loading shoe;  Position Activity Restriction  Other position/activity restrictions: up with A, COVID 19  Subjective   General  Chart Reviewed: Yes  Additional Pertinent Hx: L toe Amp, COVID +  Family / Caregiver Present: No  Referring Practitioner: Kristel Hughes MD  Diagnosis: Cellulitis of L toe  Subjective  Subjective: pt met b/s for OT. pt in bed, pleasant and agreeable to therapy.  pt denied pain      Orientation     Objective    ADL  LE Dressing: Dependent/Total(to don L sock and surgical shoe)  Additional Comments: pt declined ADLs        Balance  Sitting Balance: Supervision  Standing Balance: Stand by assistance  Functional Mobility  Functional - Mobility Device: Rolling Walker  Activity: Other  Assist Level: Stand by assistance  Functional Mobility Comments: pt completed fxl mobility short distances in room, SBA using RW. pt maintains HWB with min cues     Transfers  Sit to stand: Stand by assistance  Stand to sit: Stand by assistance  Transfer Comments: cues for hand placement                       Cognition  Overall Cognitive Status: WFL                    Type of ROM/Therapeutic Exercise  Type of ROM/Therapeutic Exercise: AROM  Exercises  Shoulder Flexion: 10  Shoulder Extension: 10  Elbow Flexion: 10  Elbow Extension: 10  Supination: 10  Pronation: 10  Grasp/Release: 10  Other: 10 chest press                 Plan   Plan  Times per week: 3-5x/wk  Current Treatment Recommendations: Strengthening, Patient/Caregiver Education & Training, Endurance Training, Balance Training, Functional Mobility Training, Safety Education & Training, Positioning, Self-Care / ADL       AM-[facility] Score  -[facility] Inpatient Daily Activity Raw Score: 19 (12/02/20 1049)  AM-PAC Inpatient ADL T-Scale Score : 40.22 (12/02/20 1049)  ADL Inpatient CMS 0-100% Score: 42.8 (12/02/20 1049)  ADL Inpatient CMS G-Code Modifier : CK (12/02/20 1049)    Goals  Short term goals  Time Frame for Short term goals: by d/c  Short term goal 1: Pt will complete toileting with min A  Short term goal 2: Pt will tolerate x4 mins of ADLs at sink while safely maintain WB status  Short term goal 3: Pt will complete functional transfers with supervision  Short term goal 4: Pt will complete LB dressing with supervision       Therapy Time   Individual Concurrent Group Co-treatment   Time In 1000         Time Out 1030         Minutes [facility], OTR/L [address]

## 2020-12-02 NOTE — CONSULTS
Clinical Pharmacy Note  Vancomycin Consult    Deborah Maxwell is a 80 y.o. female ordered Vancomycin for cellultitis/ R/O osteomyelitis; consult received from Dr. Gerard Damon to manage therapy. Also receiving cefepime and meropenem. Patient Active Problem List   Diagnosis    History of malignant melanoma of eye    Glaucoma    Hypertension    CKD (chronic kidney disease)    Hyperlipidemia    Prediabetes    Hypoxia    Osteomyelitis (HCC)       Allergies:  Patient has no known allergies. Temp max:  Temp (24hrs), Av °F (36.7 °C), Min:97.5 °F (36.4 °C), Max:98.9 °F (37.2 °C)      Recent Labs     20  1050 20  0617   WBC 8.3 5.7       Recent Labs     20  1050 20  0617   BUN 24* 13   CREATININE 1.0 0.8         Intake/Output Summary (Last 24 hours) at 2020 0901  Last data filed at 2020 1400  Gross per 24 hour   Intake 480 ml   Output --   Net 480 ml       Culture Results:  pending    Ht Readings from Last 1 Encounters:   20 5' 4.02\" (1.626 m)        Wt Readings from Last 1 Encounters:   20 129 lb 6.6 oz (58.7 kg)         Estimated Creatinine Clearance: 43 mL/min (based on SCr of 0.8 mg/dL). Assessment/Plan:  Vanco day 3  Trough 10.9 18 hr post 750mg dose    Continue Vancomycin 750 mg IV every 24 hours ordered. Regimen projects a trough level of 15-20 mg/L. Level ordered for 12/3/20 0600. Thank you for the consult.

## 2020-12-03 LAB
APTT: 57.1 SEC (ref 24.2–36.2)
D DIMER: 956 NG/ML DDU (ref 0–229)
GLUCOSE BLD-MCNC: 232 MG/DL (ref 70–99)
GLUCOSE BLD-MCNC: 251 MG/DL (ref 70–99)
PERFORMED ON: ABNORMAL
PERFORMED ON: ABNORMAL
VANCOMYCIN RANDOM: 11.1 UG/ML

## 2020-12-03 PROCEDURE — 97530 THERAPEUTIC ACTIVITIES: CPT

## 2020-12-03 PROCEDURE — 1200000000 HC SEMI PRIVATE

## 2020-12-03 PROCEDURE — 85379 FIBRIN DEGRADATION QUANT: CPT

## 2020-12-03 PROCEDURE — 6360000002 HC RX W HCPCS: Performed by: INTERNAL MEDICINE

## 2020-12-03 PROCEDURE — 2580000003 HC RX 258: Performed by: INTERNAL MEDICINE

## 2020-12-03 PROCEDURE — 6370000000 HC RX 637 (ALT 250 FOR IP): Performed by: INTERNAL MEDICINE

## 2020-12-03 PROCEDURE — 97535 SELF CARE MNGMENT TRAINING: CPT

## 2020-12-03 PROCEDURE — 94760 N-INVAS EAR/PLS OXIMETRY 1: CPT

## 2020-12-03 PROCEDURE — 80202 ASSAY OF VANCOMYCIN: CPT

## 2020-12-03 PROCEDURE — 85730 THROMBOPLASTIN TIME PARTIAL: CPT

## 2020-12-03 RX ADMIN — CLOPIDOGREL BISULFATE 75 MG: 75 TABLET ORAL at 08:56

## 2020-12-03 RX ADMIN — HEPARIN SODIUM 5000 UNITS: 5000 INJECTION INTRAVENOUS; SUBCUTANEOUS at 21:19

## 2020-12-03 RX ADMIN — HEPARIN SODIUM 5000 UNITS: 5000 INJECTION INTRAVENOUS; SUBCUTANEOUS at 16:15

## 2020-12-03 RX ADMIN — SODIUM CHLORIDE, PRESERVATIVE FREE 10 ML: 5 INJECTION INTRAVENOUS at 21:17

## 2020-12-03 RX ADMIN — ASPIRIN 81 MG: 81 TABLET, FILM COATED ORAL at 08:56

## 2020-12-03 RX ADMIN — INSULIN LISPRO 2 UNITS: 100 INJECTION, SOLUTION INTRAVENOUS; SUBCUTANEOUS at 21:19

## 2020-12-03 RX ADMIN — GABAPENTIN 100 MG: 100 CAPSULE ORAL at 08:56

## 2020-12-03 RX ADMIN — PREDNISONE 10 MG: 5 TABLET ORAL at 08:56

## 2020-12-03 RX ADMIN — GABAPENTIN 100 MG: 100 CAPSULE ORAL at 21:18

## 2020-12-03 RX ADMIN — ATORVASTATIN CALCIUM 40 MG: 40 TABLET, FILM COATED ORAL at 21:18

## 2020-12-03 RX ADMIN — HYDROCODONE BITARTRATE AND ACETAMINOPHEN 1 TABLET: 5; 325 TABLET ORAL at 21:45

## 2020-12-03 RX ADMIN — LATANOPROST 1 DROP: 50 SOLUTION OPHTHALMIC at 00:40

## 2020-12-03 RX ADMIN — Medication 1000 UNITS: at 08:56

## 2020-12-03 RX ADMIN — INSULIN LISPRO 2 UNITS: 100 INJECTION, SOLUTION INTRAVENOUS; SUBCUTANEOUS at 18:36

## 2020-12-03 RX ADMIN — FLUTICASONE PROPIONATE 1 SPRAY: 50 SPRAY, METERED NASAL at 09:07

## 2020-12-03 RX ADMIN — COLCHICINE 0.6 MG: 0.6 TABLET, FILM COATED ORAL at 08:56

## 2020-12-03 RX ADMIN — MEROPENEM 500 MG: 500 INJECTION, POWDER, FOR SOLUTION INTRAVENOUS at 21:18

## 2020-12-03 RX ADMIN — HEPARIN SODIUM 5000 UNITS: 5000 INJECTION INTRAVENOUS; SUBCUTANEOUS at 05:56

## 2020-12-03 RX ADMIN — VANCOMYCIN HYDROCHLORIDE 750 MG: 750 INJECTION, POWDER, LYOPHILIZED, FOR SOLUTION INTRAVENOUS at 10:50

## 2020-12-03 RX ADMIN — LATANOPROST 1 DROP: 50 SOLUTION OPHTHALMIC at 21:17

## 2020-12-03 RX ADMIN — MEROPENEM 500 MG: 500 INJECTION, POWDER, FOR SOLUTION INTRAVENOUS at 08:57

## 2020-12-03 ASSESSMENT — PAIN DESCRIPTION - FREQUENCY: FREQUENCY: CONTINUOUS

## 2020-12-03 ASSESSMENT — PAIN DESCRIPTION - ORIENTATION: ORIENTATION: LEFT

## 2020-12-03 ASSESSMENT — PAIN DESCRIPTION - DESCRIPTORS: DESCRIPTORS: CONSTANT;ACHING;DISCOMFORT

## 2020-12-03 ASSESSMENT — PAIN DESCRIPTION - PROGRESSION
CLINICAL_PROGRESSION: NOT CHANGED

## 2020-12-03 ASSESSMENT — PAIN SCALES - GENERAL
PAINLEVEL_OUTOF10: 6
PAINLEVEL_OUTOF10: 3
PAINLEVEL_OUTOF10: 0

## 2020-12-03 ASSESSMENT — PAIN DESCRIPTION - ONSET: ONSET: GRADUAL

## 2020-12-03 ASSESSMENT — PAIN DESCRIPTION - PAIN TYPE: TYPE: ACUTE PAIN;CHRONIC PAIN

## 2020-12-03 ASSESSMENT — PAIN - FUNCTIONAL ASSESSMENT: PAIN_FUNCTIONAL_ASSESSMENT: PREVENTS OR INTERFERES SOME ACTIVE ACTIVITIES AND ADLS

## 2020-12-03 ASSESSMENT — PAIN DESCRIPTION - LOCATION: LOCATION: FOOT

## 2020-12-03 NOTE — PROGRESS NOTES
Hospitalist Progress Note      PCP: Erin Young MD    Date of Admission: 11/30/2020    Chief Complaint:     Hospital Course: The patient is a 80 y.o. female who presents to [facility] with palpitations. Patient states that she feels like her heart has been racing for the past 24 hours. She was just recently discharged from our facility on November 28 secondary to COVID-19 pneumonia. Patient was also just recently seen at [facility] for a left toe infection status post amputation. She was sent home with IV order ertapenem and was scheduled to complete her IV antibiotics soon. While in the emergency department it appears as if her left toe infection has worsened. X-ray showed diffuse soft tissue swelling in the forefoot along with possible underlying osteomyelitis of the second metatarsal.  Podiatry was consulted in the emergency department. I initially asked for the patient to be transferred back to the primary site of surgical intervention but due to COVID-19 restrictions and hospital limitations she will need to be admitted here to our facility. Patient was started on IV antibiotics along with having blood cultures drawn.     Patient denies any fever chills, nausea vomiting, diarrhea constipation, chest pain or shortness of breath. Subjective: Patient seen and examined. MRI without any signs of osteomyelitis. Podiatry unable to see the patient yesterday due to PPE. Hopefully we can discharge on just IV antibiotics and possible debridement.   Stable from a Covid standpoint    Medications:  Reviewed    Infusion Medications   Scheduled Medications    fluticasone  1 spray Each Nostril Daily    aspirin  81 mg Oral Daily    atorvastatin  40 mg Oral Nightly    brimonidine  1 drop Left Eye TID    clopidogrel  75 mg Oral Daily    colchicine  0.6 mg Oral Daily    gabapentin  100 mg Oral BID    latanoprost  1 drop Left Eye Nightly    predniSONE  10 mg Oral Daily    Vitamin D  1,000 Units Oral Daily    sodium chloride flush  10 mL Intravenous 2 times per day    vancomycin  750 mg Intravenous Q24H    vancomycin (VANCOCIN) intermittent dosing (placeholder)   Other RX Placeholder    heparin (porcine)  5,000 Units Subcutaneous 3 times per day    meropenem  500 mg Intravenous Q12H     PRN Meds: calcium carbonate, sodium chloride, HYDROcodone-acetaminophen, sodium chloride flush, acetaminophen **OR** acetaminophen, polyethylene glycol, promethazine **OR** ondansetron      Intake/Output Summary (Last 24 hours) at 12/3/2020 0841  Last data filed at 12/2/2020 2216  Gross per 24 hour   Intake 940 ml   Output --   Net 940 ml       Physical Exam Performed:    BP (!) 152/66   Pulse 116   Temp 98 °F (36.7 °C) (Oral)   Resp 18   Ht 5' 4.02\" (1.626 m)   Wt 125 lb 14.1 oz (57.1 kg)   SpO2 96%   BMI 21.59 kg/m²     General appearance: No apparent distress, appears stated age and cooperative. HEENT: Pupils equal, round, and reactive to light. Conjunctivae/corneas clear. Neck: Supple, with full range of motion. No jugular venous distention. Trachea midline. Respiratory:  Normal respiratory effort. Clear to auscultation  Cardiovascular: Regular rate and rhythm with normal S1/S2   Abdomen: Soft, non-tender, non-distended with normal bowel sounds. Musculoskeletal: No clubbing, cyanosis or edema bilaterally. Skin:left foot plantar black ulcer, big toe and 3rd toe necrosis  Neurologic:  Neurovascularly intact without any focal sensory/motor deficits.  Cranial nerves: II-XII intact, grossly non-focal.  Psychiatric: Alert and oriented, thought content appropriate, normal insight  Capillary Refill: Brisk,< 3 seconds   Peripheral Pulses: +2 palpable, equal bilaterally       Labs:   Recent Labs     11/30/20  1050 12/01/20  0617   WBC 8.3 5.7   HGB 11.9* 9.8*   HCT 37.4 29.7*   * 410     Recent Labs     11/30/20  1050 12/01/20  0617    143   K 3.7 3.7    109   CO2 26 26   BUN 24* 13 CREATININE 1.0 0.8   CALCIUM 9.6 8.5     No results for input(s): AST, ALT, BILIDIR, BILITOT, ALKPHOS in the last 72 hours. No results for input(s): INR in the last 72 hours. Recent Labs     11/30/20  1050   TROPONINI 0.02*       Urinalysis:      Lab Results   Component Value Date    NITRU Negative 11/25/2020    WBCUA 2 11/25/2020    RBCUA 1 11/25/2020    BLOODU Negative 11/25/2020    SPECGRAV 1.014 11/25/2020    GLUCOSEU Negative 11/25/2020       Radiology:  MRI FOOT LEFT WO CONTRAST   Final Result   1. Status post partial 2nd ray amputation without convincing evidence of   osteomyelitis or other acute osseous abnormality. 2. Patchy diffuse marrow edema throughout the visualized osseous structures. This may be related to osteopenia. 3. Mild dorsal forefoot subcutaneous edema compatible cellulitis. No abscess. XR SKULL (MIN 4 VIEWS)   Final Result   No metallic foreign bodies are identified. XR FOOT LEFT (MIN 3 VIEWS)   Final Result   Diffuse soft tissue swelling predominating about the forefoot. Correlate   clinically cellulitis. Discrete erosions the 2nd metatarsal osteotomy site there is persistent   clinical concern for underlying osteomyelitis, MRI is more sensitive. Unchanged erosive findings at the 1st metatarsal head. Query underlying   erosive or inflammatory arthropathy such as gout. XR CHEST PORTABLE   Final Result   No significant interval change in perihilar and basilar opacity which can   reflect edema or in the appropriate clinical setting, pneumonia. Chronic   interstitial disease can appear similar.                      Assessment/Plan:    Left foot infection  Concern for osteomyelitis, MRI negative for osteomyelitis  Continue IV antibiotics  Podiatry consulted for possible surgical intervention and debridement  Elevated ESR and CRP  Patient with a polymicrobial infection with staph aureus along with enterobacteria cloaca at [facility]     COVID-19 pneumonia  Not requiring supplemental oxygen  Recently finished therapy   continue to monitor  With continue elevated D-dimer we will continue anticoagulation     Hyperlipidemia  Continue home statin    DVT Prophylaxis: Lovenox  Diet: DIET GENERAL;  Dietary Nutrition Supplements: Low Calorie High Protein Supplement  Code Status: Full Code    PT/OT Eval Status: 2-3 sessions per week    Dispo -inpatient, possible DC in next 1-2 days depending on podiatry input    Florian Carvajal MD

## 2020-12-03 NOTE — PROGRESS NOTES
Podiatric Surgery Daily Progress Note  Arti Coup  Subjective :   Patient seen and examined this pm. She has no pain in the foot. She has no complaints. She is sitting up in bed watching tv. She is alert and oriented. She reports that she has had surgery at [facility] and missed her follow ups as a result of the Covid 19 infection. Objective     /71   Pulse 96   Temp 98.2 °F (36.8 °C) (Oral)   Resp 16   Ht 5' 4.02\" (1.626 m)   Wt 125 lb 14.1 oz (57.1 kg)   SpO2 95%   BMI 21.59 kg/m²      I/O:    Intake/Output Summary (Last 24 hours) at 12/3/2020 1820  Last data filed at 12/2/2020 2216  Gross per 24 hour   Intake 460 ml   Output --   Net 460 ml              Wt Readings from Last 3 Encounters:   12/03/20 125 lb 14.1 oz (57.1 kg)   11/28/20 123 lb 7.3 oz (56 kg)   09/25/18 150 lb (68 kg)       LABS:    Recent Labs     12/01/20  0617   WBC 5.7   HGB 9.8*   HCT 29.7*           Recent Labs     12/01/20  0617      K 3.7      CO2 26   BUN 13   CREATININE 0.8        Recent Labs     12/02/20  0928 12/03/20  0720   APTT 31.6 57.1*         Exam:           The foot is warm. The hallux is ischemic distally and medially. There is a large open wound with evidence a synthetic skin graft covering over the 2nd ray amputation site. This area is not completely necrotic, but there is evidence of peripheral desiccation of the graft. There is a soft eschar on the plantar 5th MPJ which is not fluctuant and represents a partial thickness ischemia. The patient's foot is warm to touch, but not hot  There is no evidence of cellulitis  There is evidence of active drainage      IMAGING:  MRI negative for osteomyelitis. ASSESSMENT:   Pt. is a 80 y.o. female s/p open 2nd ray amputation with skin graft substitute  PAD with recent history intervention   Ischemia of the distal hallux and areas of forefoot. Chronic ischemic changes with apparent acute changes.   Diabetes Type 2  Covid19 infection    PLAN:  Evaluation and Management x 15 minutes with greater than 50% of the time spent with the patient discussing the etiology and treatment options of the chief complaint. Left foot infection:   No signs of acute infection this evening  Chronic ischemic changes and wound healing problems, but no evidence of active infection    The hallux has started to demarcate  She will benefit from further surgical intervention and recommend that she follow up with Dr. Dyan Whitehead and Dr. Maite Bermudez at [facility] on discharge. The patient has a follow up appointment with the vascular surgeon scheduled for Monday at [facility] as well. DISPO: Ok to d/c when medically stable from a Covid perspective. The foot is chronic and no signs of active infection at this time.         12/3/2020 6:20 PM  Alvaro Duncan DPM  [facility]  Pager: [phone]  Office: [phone]  Fax: [phone]

## 2020-12-03 NOTE — PROGRESS NOTES
Occupational Therapy  Facility/Department: [address] MED SURG  Daily Treatment Note  NAME: Leatha Goodpasture  : 1933  MRN: [id number]    Date of Service: 12/3/2020    Discharge Recommendations:  24 hour supervision or assist, S Level 1, Home with Home health OT  OT Equipment Recommendations  Other: TTB  Leatha Goodpasture scored a 19/24 on the AM-PAC ADL Inpatient form. Current research shows that an AM-PAC score of 18 or greater is typically associated with a discharge to the patient's home setting. Based on the patient's AM-PAC score, and their current ADL deficits, it is recommended that the patient have 2-3 sessions per week of Occupational Therapy at d/c to increase the patient's independence. At this time, this patient demonstrates the endurance and safety to discharge home with home and a follow up treatment frequency of 2-3x/wk. Please see assessment section for further patient specific details. If patient discharges prior to next session this note will serve as a discharge summary. Please see below for the latest assessment towards goals. Assessment   Performance deficits / Impairments: Decreased functional mobility ; Decreased balance;Decreased ADL status; Decreased endurance;Decreased strength  Assessment: Pt tolerated tx well, continues to progress with therapy. Pt completed bed mobility supervision, fxl transfers and mobility SBA using RW. Pt requires min cues to maintain heel WB status during mobility. Pt completed seated grooming with setup, toileting with SBA. Pt demo'd improvement in activity tolerance compared to previous session, though HR continues to increase to 130s/140s with activity. Cont per POC  Prognosis: Good;Fair  OT Education: OT Role;Transfer Training;Plan of Care;Energy Conservation;Precautions; ADL Adaptive Strategies  REQUIRES OT FOLLOW UP: Yes  Activity Tolerance  Activity Tolerance: Patient Tolerated treatment well  Activity Tolerance: HR up to 140s during mobility, recovered with rest breaks  Safety Devices  Safety Devices in place: Yes  Type of devices: Call light within reach; All fall risk precautions in place; Patient at risk for falls; Bed alarm in place; Left in bed         Patient Diagnosis(es): The primary encounter diagnosis was Left foot infection. Diagnoses of Cellulitis of left lower extremity and Subacute osteomyelitis of left foot (HCC) were also pertinent to this visit. has a past medical history of Chronic kidney disease, Gout, Hyperlipidemia, and Hypertension. has a past surgical history that includes Appendectomy; eye surgery (2004); and Foot surgery. Restrictions  Restrictions/Precautions  Restrictions/Precautions: Weight Bearing  Lower Extremity Weight Bearing Restrictions  Left Lower Extremity Weight Bearing: Partial Weight Bearing  Partial Weight Bearing Percentage Or Pounds: Pt with recent toe amp; per chart review pt heel WB with off loading shoe;  Position Activity Restriction  Other position/activity restrictions: Droplet Plus : COVID +. Subjective   General  Chart Reviewed: Yes  Additional Pertinent Hx: L toe Amp, COVID +  Response to previous treatment: Patient with no complaints from previous session  Family / Caregiver Present: No  Referring Practitioner: Cory Dsouza MD  Diagnosis: Cellulitis of L toe  Subjective  Subjective: pt met b/s for OT. pt in bed, pleasant and agreeable to therapy.  pt denied pain      Orientation     Objective    ADL  Grooming: Setup(pt performed oral care, washed face seated at sink)  Toileting: Contact guard assistance(pt voided urine seated on commode, performed pericare with CGA for balance)        Balance  Sitting Balance: Supervision  Standing Balance: Stand by assistance  Functional Mobility  Functional - Mobility Device: Rolling Walker  Activity: To/from bathroom  Assist Level: Stand by assistance  Functional Mobility Comments: pt completed fxl mobility from bed > sink > commode > bed using RW. cues for heel WB status  Toilet Transfers  Toilet - Technique: Ambulating  Equipment Used: Grab bars  Toilet Transfer: Stand by assistance  Toilet Transfers Comments: cues for hand placement  Bed mobility  Supine to Sit: Supervision  Sit to Supine: Supervision  Scooting: Supervision  Transfers  Sit to stand: Stand by assistance  Stand to sit: Stand by assistance  Transfer Comments: RW                       Cognition  Overall Cognitive Status: [place] 89  Times per week: 3-5x/wk  Current Treatment Recommendations: Strengthening, Patient/Caregiver Education & Training, Endurance Training, Balance Training, Functional Mobility Training, Safety Education & Training, Positioning, Self-Care / ADL    AM-PAC Score  AM-[facility] Inpatient Daily Activity Raw Score: 19 (12/02/20 1049)  AM-PAC Inpatient ADL T-Scale Score : 40.22 (12/02/20 1049)  ADL Inpatient CMS 0-100% Score: 42.8 (12/02/20 1049)  ADL Inpatient CMS G-Code Modifier : CK (12/02/20 1049)    Goals  Short term goals  Time Frame for Short term goals: by d/c  Short term goal 1: Pt will complete toileting with min A  Short term goal 2: Pt will tolerate x4 mins of ADLs at sink while safely maintain WB status  Short term goal 3: Pt will complete functional transfers with supervision  Short term goal 4: Pt will complete LB dressing with supervision       Therapy Time   Individual Concurrent Group Co-treatment   Time In 1033         Time Out 1056         Minutes [address] [place] [address], OTR/L [address]

## 2020-12-03 NOTE — PLAN OF CARE
Problem: Isolation Precautions - Risk of Spread of Infection  Goal: Prevent transmission of infection  12/3/2020 0926 by Haven Ram RN  Outcome: Ongoing     Problem: Nutrition Deficits  Goal: Optimize nutrtional status  12/3/2020 0926 by Haven Ram RN  Outcome: Ongoing     Problem: Risk for Fluid Volume Deficit  Goal: Maintain normal heart rhythm  12/3/2020 0926 by Haven Ram RN  Outcome: Ongoing     Problem: Loneliness or Risk for Loneliness  Goal: Demonstrate positive use of time alone when socialization is not possible  12/3/2020 0926 by Haven Ram RN  Outcome: Ongoing     Problem: Fatigue  Goal: Verbalize increase energy and improved vitality  12/3/2020 0926 by Haven Ram RN  Outcome: Ongoing     Problem: Skin Integrity:  Goal: Will show no infection signs and symptoms  Description: Will show no infection signs and symptoms  12/3/2020 0926 by Haven Ram RN  Outcome: Ongoing     Problem: Skin Integrity:  Goal: Absence of new skin breakdown  Description: Absence of new skin breakdown  12/3/2020 0926 by Haven Ram RN  Outcome: Ongoing     Problem: Falls - Risk of:  Goal: Will remain free from falls  Description: Will remain free from falls  12/3/2020 0926 by Haven Ram RN  Outcome: Ongoing     Problem: Falls - Risk of:  Goal: Absence of physical injury  Description: Absence of physical injury  12/3/2020 0926 by Haven Ram RN  Outcome: Ongoing     Problem: Nutrition  Goal: Optimal nutrition therapy  12/3/2020 0926 by Haven Ram RN  Outcome: Ongoing

## 2020-12-03 NOTE — CONSULTS
Clinical Pharmacy Note  Vancomycin Consult    Gael Crandall is a 80 y.o. female ordered Vancomycin for cellultitis; consult received from Dr. Anil Gonzales to manage therapy. Also receiving cefepime and meropenem. Patient Active Problem List   Diagnosis    History of malignant melanoma of eye    Glaucoma    Hypertension    CKD (chronic kidney disease)    Hyperlipidemia    Prediabetes    Hypoxia    Osteomyelitis (HCC)       Allergies:  Patient has no known allergies. Temp max:  Temp (24hrs), Av °F (36.7 °C), Min:97.4 °F (36.3 °C), Max:98.4 °F (36.9 °C)      Recent Labs     20  1050 20  0617   WBC 8.3 5.7       Recent Labs     20  1050 20  0617   BUN 24* 13   CREATININE 1.0 0.8         Intake/Output Summary (Last 24 hours) at 12/3/2020 1012  Last data filed at 2020 2216  Gross per 24 hour   Intake 700 ml   Output --   Net 700 ml       Culture Results:  pending    Ht Readings from Last 1 Encounters:   20 5' 4.02\" (1.626 m)        Wt Readings from Last 1 Encounters:   20 125 lb 14.1 oz (57.1 kg)         Estimated Creatinine Clearance: 43 mL/min (based on SCr of 0.8 mg/dL). Assessment/Plan:  Vanco day 4  Trough 11.1 ug/mL    Continue Vancomycin 750 mg IV every 24 hours ordered. Will shoot for a  trough level of 10-15 mg/L. Reviewed Dr Philip salguero -Zena Goss a vascular consultation as the natural history of this problem is critical limb ischemia with tissues damage. Osteomyelitis is less of a concern than the peripheral vascular disease   Labs, vitals, imaging available and RN description of wound does not indicate an acute or rapid spreading foot infection. Do not anticipate emergent surgical intervention. Thank you for the consult.    Mckay Rodarte, [facility]

## 2020-12-04 VITALS
SYSTOLIC BLOOD PRESSURE: 142 MMHG | TEMPERATURE: 97.8 F | RESPIRATION RATE: 17 BRPM | HEART RATE: 90 BPM | DIASTOLIC BLOOD PRESSURE: 55 MMHG | HEIGHT: 64 IN | BODY MASS INDEX: 22.51 KG/M2 | WEIGHT: 131.84 LBS | OXYGEN SATURATION: 96 %

## 2020-12-04 LAB
APTT: 34 SEC (ref 24.2–36.2)
BLOOD CULTURE, ROUTINE: NORMAL
CULTURE, BLOOD 2: NORMAL
D DIMER: 843 NG/ML DDU (ref 0–229)
GLUCOSE BLD-MCNC: 108 MG/DL (ref 70–99)
GLUCOSE BLD-MCNC: 163 MG/DL (ref 70–99)
GLUCOSE BLD-MCNC: 311 MG/DL (ref 70–99)
PERFORMED ON: ABNORMAL

## 2020-12-04 PROCEDURE — 2580000003 HC RX 258: Performed by: INTERNAL MEDICINE

## 2020-12-04 PROCEDURE — 97530 THERAPEUTIC ACTIVITIES: CPT

## 2020-12-04 PROCEDURE — 85730 THROMBOPLASTIN TIME PARTIAL: CPT

## 2020-12-04 PROCEDURE — 97116 GAIT TRAINING THERAPY: CPT

## 2020-12-04 PROCEDURE — 97535 SELF CARE MNGMENT TRAINING: CPT

## 2020-12-04 PROCEDURE — 85379 FIBRIN DEGRADATION QUANT: CPT

## 2020-12-04 PROCEDURE — 6360000002 HC RX W HCPCS: Performed by: INTERNAL MEDICINE

## 2020-12-04 PROCEDURE — 6370000000 HC RX 637 (ALT 250 FOR IP): Performed by: INTERNAL MEDICINE

## 2020-12-04 RX ORDER — GUAIFENESIN/DEXTROMETHORPHAN 100-10MG/5
5 SYRUP ORAL 3 TIMES DAILY PRN
Qty: 120 ML | Refills: 0 | Status: SHIPPED | OUTPATIENT
Start: 2020-12-04 | End: 2020-12-14

## 2020-12-04 RX ADMIN — SODIUM CHLORIDE, PRESERVATIVE FREE 10 ML: 5 INJECTION INTRAVENOUS at 08:36

## 2020-12-04 RX ADMIN — PREDNISONE 10 MG: 5 TABLET ORAL at 08:34

## 2020-12-04 RX ADMIN — INSULIN LISPRO 4 UNITS: 100 INJECTION, SOLUTION INTRAVENOUS; SUBCUTANEOUS at 17:11

## 2020-12-04 RX ADMIN — INSULIN LISPRO 1 UNITS: 100 INJECTION, SOLUTION INTRAVENOUS; SUBCUTANEOUS at 12:36

## 2020-12-04 RX ADMIN — MEROPENEM 500 MG: 500 INJECTION, POWDER, FOR SOLUTION INTRAVENOUS at 08:34

## 2020-12-04 RX ADMIN — FLUTICASONE PROPIONATE 1 SPRAY: 50 SPRAY, METERED NASAL at 08:34

## 2020-12-04 RX ADMIN — HEPARIN SODIUM 5000 UNITS: 5000 INJECTION INTRAVENOUS; SUBCUTANEOUS at 06:37

## 2020-12-04 RX ADMIN — BRIMONIDINE TARTRATE 1 DROP: 2 SOLUTION OPHTHALMIC at 14:41

## 2020-12-04 RX ADMIN — COLCHICINE 0.6 MG: 0.6 TABLET, FILM COATED ORAL at 08:34

## 2020-12-04 RX ADMIN — BRIMONIDINE TARTRATE 1 DROP: 2 SOLUTION OPHTHALMIC at 08:35

## 2020-12-04 RX ADMIN — GABAPENTIN 100 MG: 100 CAPSULE ORAL at 08:34

## 2020-12-04 RX ADMIN — Medication 1000 UNITS: at 08:34

## 2020-12-04 RX ADMIN — CLOPIDOGREL BISULFATE 75 MG: 75 TABLET ORAL at 08:34

## 2020-12-04 RX ADMIN — ASPIRIN 81 MG: 81 TABLET, FILM COATED ORAL at 08:34

## 2020-12-04 RX ADMIN — HEPARIN SODIUM 5000 UNITS: 5000 INJECTION INTRAVENOUS; SUBCUTANEOUS at 14:34

## 2020-12-04 ASSESSMENT — PAIN DESCRIPTION - PROGRESSION
CLINICAL_PROGRESSION: NOT CHANGED

## 2020-12-04 ASSESSMENT — PAIN SCALES - GENERAL
PAINLEVEL_OUTOF10: 0
PAINLEVEL_OUTOF10: 0

## 2020-12-04 NOTE — PROGRESS NOTES
Physical Therapy    Facility/Department: [address] MED SURG    Treatment note    NAME: Mendez Cook  : 1933  MRN: [id number]    Date of Service: 2020    Assessment / Discharge Recommendations:  -anticipate discharge to home when medically ready if she has 24 hour assist and supervision  Mendez Cook scored a 18/24 on the AM-PAC short mobility form. Current research shows that an AM-PAC score of 18 or greater is typically associated with a discharge to the patient's home setting.   -recommend Home PT for short duration to help assure she regains her usual baseline mobility    Activity Tolerance: Patient Tolerated treatment well       Patient Diagnosis(es): The primary encounter diagnosis was Left foot infection. Diagnoses of Cellulitis of left lower extremity and Subacute osteomyelitis of left foot (HCC) were also pertinent to this visit. has a past medical history of Chronic kidney disease, Gout, Hyperlipidemia, and Hypertension. has a past surgical history that includes Appendectomy; eye surgery (); and Foot surgery. Restrictions  Restrictions/Precautions  Restrictions/Precautions: Weight Bearing  Lower Extremity Weight Bearing Restrictions  Left Lower Extremity Weight Bearing: Partial Weight Bearing  Partial Weight Bearing Percentage Or Pounds: Pt with recent toe amp; per chart review pt heel WB with off loading shoe;  Position Activity Restriction  Other position/activity restrictions: Droplet Plus : COVID +. Subjective  General  Chart Reviewed: Yes  Patient assessed for rehabilitation services?: Yes  Additional Pertinent Hx: \"80 y.o. female who presents to [facility] with palpitations. Patient states that she feels like her heart has been racing for the past 24 hours. She was just recently discharged from our facility on  secondary to COVID-19 pneumonia. Patient was also just recently seen at [facility] for a left toe infection status post amputation.   She was sent home with IV order ertapenem and was scheduled to complete her IV antibiotics soon. While in the emergency department it appears as if her left toe infection has worsened. X-ray showed diffuse soft tissue swelling in the forefoot along with possible underlying osteomyelitis of the second metatarsal.  Podiatry was consulted in the emergency department. I initially asked for the patient to be transferred back to the primary site of surgical intervention but due to COVID-19 restrictions and hospital limitations she will need to be admitted here to our facility.   Patient was started on IV antibiotics along with having blood cultures drawn\" (Dr. Chiquita Bolden 11/30/2020)  Follows Commands: Within Functional Limits  Subjective  Subjective: arrived to room along with OT to patient resting in bed  - agreeable to PTOTsession and to get to the bathroom  Pain Screening  Patient Currently in Pain: Denies  Social/Functional History  Social/Functional History  Lives With: Daughter  Type of Home: House  Home Layout: One level  Home Access: Stairs to enter without rails  Entrance Stairs - Number of Steps: 1 PÉREZ  Bathroom Shower/Tub: Tub/Shower unit  Bathroom Toilet: Standard(with toilet raiser)  Bathroom Equipment: Grab bars in shower, Shower chair  Bathroom Accessibility: Walker accessible  Home Equipment: Rolling walker  Receives Help From: Family  ADL Assistance: Needs assistance  Bath: Maximum assistance(Daughter assists with bathing)  Dressing: Modified independent  Toileting: Needs assistance(daughter assists with BM hygiene)  Homemaking Assistance: Needs assistance(Daughter completes all cooking, cleaning, and laundry)  Homemaking Responsibilities: No  Ambulation Assistance: Needs assistance(pt reports close supervision for mobility using RW)  Transfer Assistance: Needs assistance(pt reports close supervision using RW)  Active : No  Patient's  Info: Daughter completes all driving  Additional Comments: Denies any

## 2020-12-04 NOTE — CARE COORDINATION
DISCHARGE SUMMARY     DATE OF DISCHARGE: 12/4/2020    DISCHARGE DESTINATION: Home with [facility]    TRANSPORTATION: [address] up Time: 5:30    Phone Number: [phone]    COMMENTS: Patient to return home today. Patient will resume IV antibiotic she was receiving previously from [facility]. Shiloh with [facility] #[phone] made aware of plan, AVS/DANILO completed & she can pull orders from [address] [place] [address] Rd. Transport set up per family request d/t patient weakness at 5:30 via above. Bedside RN made aware.  Electronically signed by Edenilson Nuno RN Case Management [phone] on 12/4/2020 at 4:13 PM

## 2020-12-04 NOTE — PROGRESS NOTES
Transport here to  pt. Pt d/c with documented belongings and PICC in place.  Electronically signed by Nany Lewis RN on 12/4/2020 at 6:19 PM]

## 2020-12-04 NOTE — PROGRESS NOTES
Dressing to LLE changed per order, pt tolerated well . Electronically signed by Jamia Edge RN on 12/4/2020 at 9:29 AM

## 2020-12-04 NOTE — PLAN OF CARE
Problem: Risk for Fluid Volume Deficit  Goal: Maintain normal heart rhythm  Outcome: Ongoing  Goal: Maintain absence of muscle cramping  Outcome: Ongoing  Goal: Maintain normal serum potassium, sodium, calcium, phosphorus, and pH  Outcome: Ongoing     Problem: Falls - Risk of:  Goal: Will remain free from falls  Description: Will remain free from falls  Outcome: Ongoing  Goal: Absence of physical injury  Description: Absence of physical injury  Outcome: Ongoing     Problem: Pain:  Goal: Pain level will decrease  Description: Pain level will decrease  Outcome: Ongoing  Goal: Control of acute pain  Description: Control of acute pain  Outcome: Ongoing  Goal: Control of chronic pain  Description: Control of chronic pain  Outcome: Ongoing

## 2020-12-04 NOTE — DISCHARGE SUMMARY
Hospital Medicine Discharge Summary    Patient ID: Patty Stokes      Patient's PCP: Saad Mosher MD    Admit Date: 11/30/2020     Discharge Date:   12/4/20    Admitting Physician: Amalia Andino MD     Discharge Physician: Amalia Andino MD     Discharge Diagnoses: Active Hospital Problems    Diagnosis Date Noted    Osteomyelitis [facility]) [M86.9] 11/30/2020       The patient was seen and examined on day of discharge and this discharge summary is in conjunction with any daily progress note from day of discharge. Hospital Course: The patient is a 80 y. o. female who presents to [facility] with palpitations.  Patient states that she feels like her heart has been racing for the past 24 hours. Daya Andrade was just recently discharged from our facility on November 28 secondary to COVID-19 pneumonia. Sanjay Mott was also just recently seen at [facility] for a left toe infection status post amputation.  She was sent home with IV order ertapenem and was scheduled to complete her IV antibiotics soon.  While in the emergency department it appears as if her left toe infection has worsened. Nayely Mauricio showed diffuse soft tissue swelling in the forefoot along with possible underlying osteomyelitis of the second metatarsal.  Podiatry was consulted in the emergency department.  I initially asked for the patient to be transferred back to the primary site of surgical intervention but due to COVID-19 restrictions and hospital limitations she will need to be admitted here to our facility. Sanjay Mott was started on IV antibiotics along with having blood cultures drawn.     Patient denies any fever chills, nausea vomiting, diarrhea constipation, chest pain or shortness of breath. MRI without any signs of osteomyelitis.      Left foot infection  Concern for osteomyelitis, MRI negative for osteomyelitis  Continue IV antibiotics, stop at DC  Podiatry consulted for possible surgical intervention and debridement; no signs of active infection, chronic ischemic changes and wound healing problems but no evidence of active infection, she would benefit from further surgical intervention recommended she follow-up with Dr. Roxi Tilley and Dr. Jacob Silva at [facility].  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 HEENT: Pupils equal, round, and reactive to light. Conjunctivae/corneas clear. Neck: Supple, with full range of motion. No jugular venous distention. Trachea midline. Respiratory:  Normal respiratory effort. Clear to auscultation  Cardiovascular: Regular rate and rhythm with normal S1/S2   Abdomen: Soft, non-tender, non-distended with normal bowel sounds. Musculoskeletal: No clubbing, cyanosis or edema bilaterally. Skin:left foot plantar black ulcer, big toe and 3rd toe necrosis  Neurologic:  Neurovascularly intact without any focal sensory/motor deficits. Cranial nerves: II-XII intact, grossly non-focal.  Psychiatric: Alert and oriented, thought content appropriate, normal insight  Capillary Refill: Brisk,< 3 seconds   Peripheral Pulses: +2 palpable, equal bilaterally       Consults:     IP CONSULT TO INTERNAL MEDICINE  IP CONSULT TO PODIATRY  IP CONSULT TO PODIATRY    Significant Diagnostic Studies:     MRI FOOT LEFT WO CONTRAST   Final Result   1.  Status post partial 2nd ray tablet Take 1 tablet by mouth every 8 hours as needed for Pain. ASPIRIN LOW DOSE 81 MG EC tablet Take 81 mg by mouth daily      clopidogrel (PLAVIX) 75 MG tablet Take 75 mg by mouth daily      colchicine (COLCRYS) 0.6 MG tablet Take 0.6 mg by mouth daily      gabapentin (NEURONTIN) 100 MG capsule Take 100 mg by mouth 2 times daily. atorvastatin (LIPITOR) 40 MG tablet Take 40 mg by mouth daily      predniSONE (DELTASONE) 10 MG tablet Take 10 mg by mouth daily      vitamin D (CHOLECALCIFEROL) 1000 UNIT TABS tablet Take 1,000 Units by mouth daily      latanoprost (XALATAN) 0.005 % ophthalmic solution Place 1 drop into the left eye nightly      brimonidine (ALPHAGAN) 0.2 % ophthalmic solution Place 1 drop into the left eye 3 times daily      acetaminophen (TYLENOL) 325 MG tablet Take 650 mg by mouth every 6 hours as needed. Associated Diagnoses: Melanoma of eye ([address] Glaucoma; Edema             No future appointments. Time Spent on discharge is more than 30 minutes in the examination, evaluation, counseling and review of medications and discharge plan. Signed:    Brenda Pyle MD   12/4/2020      Thank you Tong Whitley MD for the opportunity to be involved in this patient's care. If you have any questions or concerns please feel free to contact me at [phone].

## 2020-12-04 NOTE — PROGRESS NOTES
Call out to pt's Elian Arteaga, updated on POC. Aware of d/c and went over d/c paperwork. No questions at this time, left call back number in case any do arise.  Electronically signed by Reji Malcolm RN on 12/4/2020 at 3:08 PM

## 2020-12-04 NOTE — DISCHARGE INSTR - COC
Continuity of Care Form    Patient Name: Misty Levine   :  1933  MRN:  [id number]    Admit date:  2020  Discharge date:  2020      Code Status Order: Full Code   Advance Directives:   [facility] Documentation       Date/Time Healthcare Directive Type of Healthcare Directive Copy in [facility] [address] Agent's Name Healthcare Agent's Phone Number    20 [phone]  No, patient does not have an advance directive for healthcare treatment -- -- -- -- --            Admitting Physician:  Darcy Toribio MD  PCP: Ly Gruber MD    Discharging Nurse: [facility] Unit/Room#: Y3B-1558/9034-91  Discharging Unit Phone Number: [phone]    Emergency Contact:   Extended Emergency Contact Information  Primary Emergency Contact: Rolly Xavier Phone: [phone]  Relation: Child  Secondary Emergency Contact: Anna Quarles [address] Phone: [phone]  Relation: Child    Past Surgical History:  Past Surgical History:   Procedure Laterality Date    APPENDECTOMY      EYE SURGERY  2004    XRT plaque OD for melanoma    FOOT SURGERY      left       Immunization History:   Immunization History   Administered Date(s) Administered    Influenza Virus Vaccine 2012    Influenza, High Dose (Fluzone 65 yrs and older) 2018    Influenza, Keane Pulse, IM, (6 mo and older Fluzone, Flulaval, Fluarix and 3 yrs and older Afluria) 10/24/2016    Influenza, Quadv, IM, PF (6 mo and older Fluzone, Flulaval, Fluarix, and 3 yrs and older Afluria) 2017    Pneumococcal Conjugate 13-valent ([id number]) 2016    Pneumococcal Polysaccharide ([id number]) 2013    Tdap (Boostrix, Adacel) 2016    Zoster Recombinant (Shingrix) 2018       Active Problems:  Patient Active Problem List   Diagnosis Code    History of malignant melanoma of eye Z85.840    Glaucoma H40.9    Hypertension I10    CKD (chronic kidney disease) N18.9    Hyperlipidemia E78.2    Prediabetes R73.03    Hypoxia R09.02    Osteomyelitis (HCC) M86.9       Isolation/Infection:   Isolation            Droplet Plus          Unreconciled External Infections       Infection Onset Last Indicated Last Received Source    No mapped external infections found      . Unmapped Infections (1)        Coronavirus COVID-19 11/15/20 11/15/20 11/25/20                   Patient Infection Status       Infection Onset Added Last Indicated Last Indicated By Review Planned Expiration Resolved Resolved By    COVID-19 11/15/20 11/26/20 11/30/20 COVID-19 12/07/20 12/14/20      Outside facility            Nurse Assessment:  Last Vital Signs: BP (!) 162/72   Pulse 103   Temp 97.3 °F (36.3 °C) (Oral)   Resp 16   Ht 5' 4.02\" (1.626 m)   Wt 131 lb 13.4 oz (59.8 kg)   SpO2 91%   BMI 22.62 kg/m²     Last documented pain score (0-10 scale): Pain Level: 0  Last Weight:   Wt Readings from Last 1 Encounters:   12/04/20 131 lb 13.4 oz (59.8 kg)     Mental Status:  oriented and alert    IV Access:  - PICC - site  R Basilic, insertion date: ***    Nursing Mobility/ADLs:  Walking   Assisted  Transfer  Assisted  Bathing  Assisted  Dressing  Assisted  Toileting  Assisted  Feeding  Independent  Med Admin  Assisted  Med Delivery   whole    Wound Care Documentation and Therapy:  Wound 11/25/20 Coccyx Inner (Active)   Wound Etiology Pressure Stage  2 12/03/20 2113   Dressing Status Clean;Dry; Intact 12/03/20 2113   Wound Cleansed Cleansed with saline 12/03/20 2113   Dressing/Treatment Zinc paste;Silicone border 74/36/56 2113   Dressing Change Due 12/03/20 12/03/20 0856   Wound Length (cm) 1 cm 12/02/20 0937   Wound Width (cm) 2 cm 12/02/20 0937   Wound Surface Area (cm^2) 2 cm^2 12/02/20 0937   Change in Wound Size % (l*w) 0 12/02/20 0937   Wound Assessment Pink/red 12/03/20 2113   Drainage Amount None 12/03/20 2113   Odor None 12/03/20 2113   Lidia-wound Assessment Intact 12/03/20 0856   Margins Defined edges 12/03/20 0856   Number of days: 8       Wound 11/25/20 Buttocks Left left buttock (Active)   Wound Etiology Pressure Stage  2 11/27/20 2020   Dressing Status Clean;Dry; Intact 11/28/20 1013   Wound Cleansed Wound cleanser 11/28/20 1013   Dressing/Treatment Foam 11/28/20 1013   Wound Length (cm) 0.5 cm 11/26/20 1700   Wound Width (cm) 0.5 cm 11/26/20 1700   Wound Surface Area (cm^2) 0.25 cm^2 11/26/20 1700   Wound Assessment Pink/red 11/28/20 1013   Drainage Amount None 11/28/20 1013   Odor None 11/28/20 1013   Number of days: 8       Wound 11/25/20 Toe (Comment  which one) Left (Active)   Wound Etiology Surgical 12/03/20 2113   Dressing Status Clean;Dry; Intact 12/03/20 2113   Wound Cleansed Not Cleansed 12/03/20 2113   Dressing/Treatment Other (comment);Gauze dressing/dressing sponge 12/03/20 2113   Dressing Change Due 12/03/20 12/02/20 2057   Wound Assessment Pink/red;Eschar moist 12/02/20 2057   Drainage Amount Scant 12/02/20 2057   Drainage Description Serosanguinous 12/02/20 2057   Odor None 12/02/20 2057   Lidia-wound Assessment Intact;Dry/flaky 12/02/20 2057   Number of days: 8       Wound 11/25/20 Coccyx Left circular (Active)   Wound Etiology Pressure Stage  2 12/03/20 2113   Dressing Status Clean;Dry; Intact 12/03/20 2113   Wound Cleansed Soap and water 12/02/20 2057   Dressing/Treatment Silicone border;Zinc paste 12/03/20 2113   Dressing Change Due 12/03/20 12/03/20 0856   Wound Length (cm) 0.5 cm 12/02/20 0937   Wound Width (cm) 0.5 cm 12/02/20 0937   Wound Surface Area (cm^2) 0.25 cm^2 12/02/20 0937   Change in Wound Size % (l*w) 0 12/02/20 0937   Wound Assessment Pink/red;Dry 12/03/20 0856   Drainage Amount None 12/03/20 0856   Odor None 12/03/20 2113   Lidia-wound Assessment Intact 12/03/20 2113   Number of days: 8       Wound 11/26/20 Coccyx Mid circular (Active)   Wound Etiology Pressure Stage  2 12/02/20 2057   Dressing Status Clean;Dry; Intact 12/03/20 2113   Wound Cleansed Soap and water Electronically signed by Patricia Hoskins RN on 12/4/20 at 9:39 AM EST    CASE MANAGEMENT/SOCIAL WORK SECTION    Inpatient Status Date: 11/30/2020    Readmission Risk Assessment Score:  Readmission Risk              Risk of Unplanned Readmission:        15           Discharging to Facility/ Agency   Name: The [address] [place] [address]  Address: Andrey Dobbs  [address]  Phone: [phone] admissions department  Fax:      / signature: Electronically signed by Edenilson Nuno RN on 12/4/2020 at 12:15 PM      PHYSICIAN SECTION    Prognosis: Fair    Condition at Discharge: Stable    Rehab Potential (if transferring to Rehab): Fair    Recommended Labs or Other Treatments After Discharge: PT, OT, HHA, Vn, Ertapenem 1gm daily per previous recommendations. IV abx needed until 12/29 to finish 6 week course. Weekly CBC, CMP, CRP, ESR - Please send results to [facility]    Physician Certification: I certify the above information and transfer of Misty Gadsden  is necessary for the continuing treatment of the diagnosis listed and that she requires [address] for less 30 days.      Update Admission H&P: No change in H&P    PHYSICIAN SIGNATURE:  Electronically signed by Darcy Toribio MD on 12/4/20 at 8:26 AM EST

## 2020-12-04 NOTE — PROGRESS NOTES
Occupational Therapy  Facility/Department: [address] MED SURG  Daily Treatment Note  NAME: Miranda Chou  : 1933  MRN: [id number]    Date of Service: 2020    Assessment: Pt tolerated session well, continues to have O2 sats WFL on RA however HR increases to 140 with activity. Pt completed bed mobility with SPV and sit <> stand transfers with SBA/CGA however required min A to stnad from commode. Pt completed toileting - clothing management and pericare with CGA. Pt tolerated ~2-3 minutes of standing activity. Anticipate pt to require min A for bathing/dressing/toileting needs at d/c. Anticipate pt safe to d/c home with  supervision/assist and home health OT. Discharge Recommendations:  24 hour supervision or assist, S Level 1, Home with Home health OT  OT Equipment Recommendations  Other: TTB    HOME HEALTH CARE: LEVEL 1 STANDARD    - Initial home health evaluation to occur within 24-48 hours, in patient home   - Therapy to evaluate with goal of regaining prior level of functioning   - Therapy to evaluate if patient has [address] needs for personal care    Assessment   Performance deficits / Impairments: Decreased functional mobility ; Decreased balance;Decreased ADL status; Decreased endurance;Decreased strength  Assessment: Pt tolerated session well, continues to have O2 sats WFL on RA however HR increases to 140 with activity. Pt completed bed mobility with SPV and sit <> stand transfers with SBA/CGA however required min A to stnad from commode. Pt completed toileting - clothing management and pericare with CGA. Pt tolerated ~2-3 minutes of standing activity. Anticipate pt to require min A for bathing/dressing/toileting needs at d/c. Anticipate pt safe to d/c home with 24/7 supervision/assist and home health OT. Prognosis: Good;Fair  OT Education: OT Role;Transfer Training;Plan of Care;Energy Conservation;Precautions; ADL Adaptive Strategies  Patient Education: WB status  REQUIRES OT FOLLOW UP: Yes  Activity Tolerance  Activity Tolerance: Patient Tolerated treatment well  Activity Tolerance: HR up to 140s during mobility, recovered with rest breaks, O2 sats WFL on RA  Safety Devices  Safety Devices in place: Yes(RN and PCA aware)  Type of devices: Nurse notified;Call light within reach; Chair alarm in place; Left in chair         Patient Diagnosis(es): The primary encounter diagnosis was Left foot infection. Diagnoses of Cellulitis of left lower extremity and Subacute osteomyelitis of left foot (HCC) were also pertinent to this visit. has a past medical history of Chronic kidney disease, Gout, Hyperlipidemia, and Hypertension. has a past surgical history that includes Appendectomy; eye surgery (2004); and Foot surgery. Restrictions  Restrictions/Precautions  Restrictions/Precautions: Weight Bearing  Lower Extremity Weight Bearing Restrictions  Left Lower Extremity Weight Bearing: Partial Weight Bearing  Partial Weight Bearing Percentage Or Pounds: Pt with recent toe amp; per chart review pt heel WB with off loading shoe;  Position Activity Restriction  Other position/activity restrictions: Droplet Plus : COVID +. Subjective   General  Chart Reviewed: Yes  Patient assessed for rehabilitation services?: Yes  Additional Pertinent Hx: L toe Amp, COVID +  Response to previous treatment: Patient with no complaints from previous session  Family / Caregiver Present: No  Referring Practitioner: Solomon Howard MD  Diagnosis: Cellulitis of L toe  Subjective  Subjective: Pt met bedside, agreeable for therapy treatment session and OOB activity. Requesting to use restroom.   Vital Signs  Patient Currently in Pain: (No complaints of pain at this time)     Objective    ADL  Grooming: Stand by assistance(Pt washed hands at sink with SBA)  Toileting: Contact guard assistance;Setup(Pt managed clothing with CGA and pericare with setup)  Additional Comments: Declined further ADL needs        Balance  Sitting Balance: Stand by assistance  Standing Balance: Stand by assistance  Standing Balance  Time: ~2-3 mintues  Activity: small household mobility  Comment: Pt is HWB to LLE with surgical shoe; with RW; slow speed    Functional Mobility  Functional - Mobility Device: Rolling Walker  Activity: To/from bathroom  Assist Level: (SBA/CGA)  Functional Mobility Comments: pt completed fxl mobility from bed > commode > sink > recliner using RW with SBA/CGA. cues for heel WB status    Toilet Transfers  Toilet - Technique: Ambulating(RW)  Equipment Used: Grab bars(Comfort height commode)  Toilet Transfer: Minimal assistance    Bed mobility  Supine to Sit: Supervision  Sit to Supine: Unable to assess(Up in chair at end of session)     Transfers  Sit to stand: Stand by assistance;Contact guard assistance  Stand to sit: Stand by assistance;Contact guard assistance  Transfer Comments: SBA/CGA for sit <> stand from EOB to RW and sit to recliner chair        Cognition  Overall Cognitive Status: Josephine 89  Times per week: 3-5x/wk  Current Treatment Recommendations: Strengthening, Patient/Caregiver Education & Training, Endurance Training, Balance Training, Functional Mobility Training, Safety Education & Training, Positioning, Self-Care / ADL    AM-PAC Score    Grady Boateng scored a 19/24 on the AM-PAC ADL Inpatient form. Current research shows that an AM-PAC score of 18 or greater is typically associated with a discharge to the patient's home setting. Based on the patient's AM-PAC score, and their current ADL deficits, it is recommended that the patient have 2-3 sessions per week of Occupational Therapy at d/c to increase the patient's independence. At this time, this patient demonstrates the endurance and safety to discharge home with home health OT (home vs OP services) and a follow up treatment frequency of 2-3x/wk. Please see assessment section for further patient specific details.     If patient discharges prior to next session this note will serve as a discharge summary. Please see below for the latest assessment towards goals. AM-PAC Inpatient Daily Activity Raw Score: 19 (12/04/20 1135)  AM-PAC Inpatient ADL T-Scale Score : 40.22 (12/04/20 1135)  ADL Inpatient CMS 0-100% Score: 42.8 (12/04/20 1135)  ADL Inpatient CMS G-Code Modifier : CK (12/04/20 1135)    Goals  Short term goals  Time Frame for Short term goals: by d/c: status of goals ongoing as of 12/4/20  Short term goal 1: Pt will complete toileting with min A  Short term goal 2: Pt will tolerate x4 mins of ADLs at sink while safely maintain WB status  Short term goal 3: Pt will complete functional transfers with supervision  Short term goal 4: Pt will complete LB dressing with supervision       Therapy Time   Individual Concurrent Group Co-treatment   Time In       1105   Time Out       1130   Minutes       25   Timed Code Treatment Minutes: 25 Minutes     If pt is discharged prior to next OT session, this note will serve as the discharge summary.     Sydni Marcial, SADIEN/O#[id number]

## 2020-12-04 NOTE — PLAN OF CARE
Problem: Airway Clearance - Ineffective  Goal: Achieve or maintain patent airway  12/4/2020 0927 by Mu Roe RN  Outcome: Ongoing     Problem: Gas Exchange - Impaired  Goal: Absence of hypoxia  12/4/2020 0927 by Mu Roe RN  Outcome: Ongoing     Problem: Gas Exchange - Impaired  Goal: Promote optimal lung function  12/4/2020 0927 by Mu Roe RN  Outcome: Ongoing     Problem: Breathing Pattern - Ineffective  Goal: Ability to achieve and maintain a regular respiratory rate  12/4/2020 0927 by Mu Roe RN  Outcome: Ongoing     Problem: Body Temperature -  Risk of, Imbalanced  Goal: Ability to maintain a body temperature within defined limits  12/4/2020 0927 by Mu Roe RN  Outcome: Ongoing     Problem: Body Temperature -  Risk of, Imbalanced  Goal: Will regain or maintain usual level of consciousness  12/4/2020 0927 by Mu Roe RN  Outcome: Ongoing     Problem:  Body Temperature -  Risk of, Imbalanced  Goal: Complications related to the disease process, condition or treatment will be avoided or minimized  12/4/2020 0927 by Mu Roe RN  Outcome: Ongoing     Problem: Isolation Precautions - Risk of Spread of Infection  Goal: Prevent transmission of infection  12/4/2020 0927 by Mu Roe RN  Outcome: Ongoing     Problem: Nutrition Deficits  Goal: Optimize nutrtional status  12/4/2020 0927 by Mu Roe RN  Outcome: Ongoing     Problem: Risk for Fluid Volume Deficit  Goal: Maintain normal heart rhythm  12/4/2020 0927 by Mu Roe RN  Outcome: Ongoing     Problem: Risk for Fluid Volume Deficit  Goal: Maintain absence of muscle cramping  12/4/2020 0927 by Mu Roe RN  Outcome: Ongoing     Problem: Risk for Fluid Volume Deficit  Goal: Maintain normal serum potassium, sodium, calcium, phosphorus, and pH  12/4/2020 0927 by Mu Roe RN  Outcome: Ongoing     Problem: Loneliness or Risk for Loneliness  Goal: Demonstrate positive use of time alone when socialization is not possible  12/4/2020 6864 by Yaya Infante RN  Outcome: Ongoing     Problem: Fatigue  Goal: Verbalize increase energy and improved vitality  12/4/2020 0927 by Yaya Infante RN  Outcome: Ongoing     Problem: Patient Education: Go to Patient Education Activity  Goal: Patient/Family Education  12/4/2020 0927 by Yaya Infante RN  Outcome: Ongoing     Problem: Skin Integrity:  Goal: Will show no infection signs and symptoms  Description: Will show no infection signs and symptoms  12/4/2020 0927 by Yaya Infante RN  Outcome: Ongoing     Problem: Skin Integrity:  Goal: Absence of new skin breakdown  Description: Absence of new skin breakdown  12/4/2020 0927 by Yaya Infante RN  Outcome: Ongoing     Problem: Falls - Risk of:  Goal: Will remain free from falls  Description: Will remain free from falls  12/4/2020 0927 by Yaya Infante RN  Outcome: Ongoing     Problem: Falls - Risk of:  Goal: Absence of physical injury  Description: Absence of physical injury  12/4/2020 0927 by Yaya Infante RN  Outcome: Ongoing     Problem: Nutrition  Goal: Optimal nutrition therapy  12/4/2020 0927 by Yaya Infante RN  Outcome: Ongoing     Problem: Pain:  Goal: Pain level will decrease  Description: Pain level will decrease  12/4/2020 0927 by Yaya Infante RN  Outcome: Ongoing     Problem: Pain:  Goal: Control of acute pain  Description: Control of acute pain  12/4/2020 0927 by Yaya Infante RN  Outcome: Ongoing     Problem: Pain:  Goal: Control of chronic pain  Description: Control of chronic pain  12/4/2020 0927 by Yaya Infante RN  Outcome: Ongoing

## 2020-12-05 ENCOUNTER — CARE COORDINATION (OUTPATIENT)
Dept: CASE MANAGEMENT | Age: 85
End: 2020-12-05

## 2020-12-05 NOTE — CARE COORDINATION
Initial 24 hr COVID 19  call attempted, contact info left on vm. PC made to [facility], they have the resumption of care orders and will be seeing pt today.

## 2020-12-07 ENCOUNTER — CARE COORDINATION (OUTPATIENT)
Dept: CASE MANAGEMENT | Age: 85
End: 2020-12-07

## 2020-12-07 NOTE — CARE COORDINATION
2nd and final attempt at [facility] 19 monitoring discharge phone call. Unable to reach patient. Left message. Informed Beverly this would be the final CTN outreach.

## 2020-12-17 NOTE — CONSULTS
[address]                                  CONSULTATION    PATIENT NAME: Kellee Mauricio                        :        1933  MED REC NO:   [id number]                          ROOM:       [id number]  ACCOUNT NO:   [de-identified]                           ADMIT DATE: 2020  PROVIDER:     Gus Batista DPM    PODIATRY CONSULTATION    CONSULT DATE:  2020    CHIEF COMPLAINT/HISTORY OF PRESENT ILLNESS:  This 40-year-old female was  admitted through the [facility] Emergency Department with a chief  complaint of shortness of breath. Podiatry was consulted as the patient  has previously undergone an incision and drainage procedure of her left  foot at [facility] with amputation of the left second toe and  application of a biologic skin substitute. The patient relates she has  not followed up at [facility] with Dr. Jaylon Cassidy or Dr. Carie Dorsey due to  COVID-19 infection. Podiatry was consulted for further evaluation and  management. PAST MEDICAL HISTORY:  Significant for hypertension, hyperlipidemia,  chronic kidney disease, chronic gout, peripheral vascular disease. PAST SURGICAL HISTORY:  Significant for appendectomy, previous  endovascular revascularization and previous foot surgery with a left  second ray amputation. MEDICATION:  Please see the medical record. ALLERGIES:  No known drug allergies. SOCIAL HISTORY:  The patient denies tobacco, alcohol, or drug use. FAMILY HISTORY:  Noncontributory at this time. REVIEW OF SYSTEMS:  Significant for shortness of breath, however, the  patient's _____ is improving. She specifically denies nausea, fever,  vomiting, chills, chest pain, or difficulty urinating, diarrhea or  constipation. PHYSICAL EXAMINATION:  The patient's vital signs are stable. She is  currently afebrile. The patient has a nonpalpable pedal pulses noted bilaterally.   She has  atrophic skin changes noted on the bilateral lower extremities. There  is no hair growth noted. The patient's protective sensation is tested with a monofilament, is  intact proximal to the mid foot but diminished in the digits  bilaterally. The patient has had a previous left second ray amputation. There is a  plantar soft eschar noted _____ fifth metatarsophalangeal joint without  any purulent drainage associated with it. There is no crepitus  associated with it. There is no surrounding cellulitis noted. Inspection of the patient's left foot shows she has had an open previous  left second ray amputation. There is a biologic skin substitute noted  over the wound base that is, at this time, well adhered and the sutures  are intact. There is no purulent drainage, fluctuance, crepitus, or  malodor noted. The distal and lateral aspects of the patient's left  great toenail is dusky and cool. The patient's renal panel was within normal limits. Her white blood  cell count was within normal limits. ASSESSMENTS:  1.  Status post left second toe amputation with history of osteomyelitis  and application of biologic skin substitute. 2.  Dry gangrenous changes to the left hallux. TREATMENTS:  1. The patient was seen and evaluated at bedside. 2.  Orders were written for local wound care daily with hydrogel as she  relates that is what they are putting on her biologic graft at home. 3.  The open amputation site and plantar ulceration did not appear  acutely infected at this time. Recommend the patient to follow up with  Dr. Gabriel Foot with Podiatry at [facility] at discharge. 4.  The patient should be strictly nonweightbearing on her left foot due  to her open amputation site. 5.  It is okay to discharge from a Podiatry standpoint when the patient  is medically stable. Thank you for allowing us to participate in the care of this patient.         Edgard Ramirez DPM    D: 12/17/2020 13:54:40 T: 12/17/2020 15:52:25     [email or web address]  Job#: [id number]     Doc#: [id number]    CC:

## 2020-12-18 ENCOUNTER — APPOINTMENT (OUTPATIENT)
Dept: GENERAL RADIOLOGY | Age: 85
End: 2020-12-18
Payer: MEDICARE

## 2020-12-18 ENCOUNTER — HOSPITAL ENCOUNTER (EMERGENCY)
Age: 85
Discharge: HOME | End: 2020-12-18
Attending: EMERGENCY MEDICINE
Payer: MEDICARE

## 2020-12-18 ENCOUNTER — APPOINTMENT (OUTPATIENT)
Dept: CT IMAGING | Age: 85
End: 2020-12-18
Payer: MEDICARE

## 2020-12-18 VITALS
OXYGEN SATURATION: 98 % | TEMPERATURE: 99.2 F | RESPIRATION RATE: 25 BRPM | WEIGHT: 130.51 LBS | HEART RATE: 97 BPM | SYSTOLIC BLOOD PRESSURE: 165 MMHG | DIASTOLIC BLOOD PRESSURE: 76 MMHG | BODY MASS INDEX: 22.39 KG/M2

## 2020-12-18 LAB
A/G RATIO: 1 (ref 1.1–2.2)
ALBUMIN SERPL-MCNC: 3.2 G/DL (ref 3.4–5)
ALP BLD-CCNC: 94 U/L (ref 40–129)
ALT SERPL-CCNC: 17 U/L (ref 10–40)
ANION GAP SERPL CALCULATED.3IONS-SCNC: 14 MMOL/L (ref 3–16)
AST SERPL-CCNC: 32 U/L (ref 15–37)
BASOPHILS ABSOLUTE: 0 K/UL (ref 0–0.2)
BASOPHILS RELATIVE PERCENT: 0.4 %
BILIRUB SERPL-MCNC: <0.2 MG/DL (ref 0–1)
BUN BLDV-MCNC: 11 MG/DL (ref 7–20)
CALCIUM SERPL-MCNC: 9.8 MG/DL (ref 8.3–10.6)
CHLORIDE BLD-SCNC: 108 MMOL/L (ref 99–110)
CO2: 23 MMOL/L (ref 21–32)
CREAT SERPL-MCNC: 0.9 MG/DL (ref 0.6–1.2)
EKG ATRIAL RATE: 129 BPM
EKG DIAGNOSIS: NORMAL
EKG P AXIS: 73 DEGREES
EKG P-R INTERVAL: 134 MS
EKG Q-T INTERVAL: 314 MS
EKG QRS DURATION: 76 MS
EKG QTC CALCULATION (BAZETT): 432 MS
EKG R AXIS: 5 DEGREES
EKG T AXIS: 39 DEGREES
EKG VENTRICULAR RATE: 114 BPM
EOSINOPHILS ABSOLUTE: 0 K/UL (ref 0–0.6)
EOSINOPHILS RELATIVE PERCENT: 0.2 %
GFR AFRICAN AMERICAN: >60
GFR NON-AFRICAN AMERICAN: 59
GLOBULIN: 3.3 G/DL
GLUCOSE BLD-MCNC: 139 MG/DL (ref 70–99)
HCT VFR BLD CALC: 35.4 % (ref 36–48)
HEMOGLOBIN: 11.1 G/DL (ref 12–16)
LYMPHOCYTES ABSOLUTE: 1.2 K/UL (ref 1–5.1)
LYMPHOCYTES RELATIVE PERCENT: 14.4 %
MCH RBC QN AUTO: 28 PG (ref 26–34)
MCHC RBC AUTO-ENTMCNC: 31.4 G/DL (ref 31–36)
MCV RBC AUTO: 89.1 FL (ref 80–100)
MONOCYTES ABSOLUTE: 0.4 K/UL (ref 0–1.3)
MONOCYTES RELATIVE PERCENT: 4.1 %
NEUTROPHILS ABSOLUTE: 6.9 K/UL (ref 1.7–7.7)
NEUTROPHILS RELATIVE PERCENT: 80.9 %
PDW BLD-RTO: 18.7 % (ref 12.4–15.4)
PLATELET # BLD: 361 K/UL (ref 135–450)
PMV BLD AUTO: 8.1 FL (ref 5–10.5)
POTASSIUM REFLEX MAGNESIUM: 4.3 MMOL/L (ref 3.5–5.1)
RBC # BLD: 3.97 M/UL (ref 4–5.2)
SODIUM BLD-SCNC: 145 MMOL/L (ref 136–145)
TOTAL PROTEIN: 6.5 G/DL (ref 6.4–8.2)
TROPONIN: <0.01 NG/ML
WBC # BLD: 8.6 K/UL (ref 4–11)

## 2020-12-18 PROCEDURE — 71260 CT THORAX DX C+: CPT

## 2020-12-18 PROCEDURE — 84484 ASSAY OF TROPONIN QUANT: CPT

## 2020-12-18 PROCEDURE — 93010 ELECTROCARDIOGRAM REPORT: CPT | Performed by: INTERNAL MEDICINE

## 2020-12-18 PROCEDURE — 80053 COMPREHEN METABOLIC PANEL: CPT

## 2020-12-18 PROCEDURE — 73630 X-RAY EXAM OF FOOT: CPT

## 2020-12-18 PROCEDURE — 99283 EMERGENCY DEPT VISIT LOW MDM: CPT

## 2020-12-18 PROCEDURE — 71275 CT ANGIOGRAPHY CHEST: CPT

## 2020-12-18 PROCEDURE — 6360000004 HC RX CONTRAST MEDICATION: Performed by: EMERGENCY MEDICINE

## 2020-12-18 PROCEDURE — 96361 HYDRATE IV INFUSION ADD-ON: CPT

## 2020-12-18 PROCEDURE — 93005 ELECTROCARDIOGRAM TRACING: CPT | Performed by: EMERGENCY MEDICINE

## 2020-12-18 PROCEDURE — 85025 COMPLETE CBC W/AUTO DIFF WBC: CPT

## 2020-12-18 PROCEDURE — 2580000003 HC RX 258: Performed by: EMERGENCY MEDICINE

## 2020-12-18 PROCEDURE — 96360 HYDRATION IV INFUSION INIT: CPT

## 2020-12-18 RX ORDER — 0.9 % SODIUM CHLORIDE 0.9 %
1000 INTRAVENOUS SOLUTION INTRAVENOUS ONCE
Status: COMPLETED | OUTPATIENT
Start: 2020-12-18 | End: 2020-12-18

## 2020-12-18 RX ADMIN — SODIUM CHLORIDE 1000 ML: 9 INJECTION, SOLUTION INTRAVENOUS at 15:40

## 2020-12-18 RX ADMIN — IOPAMIDOL 75 ML: 755 INJECTION, SOLUTION INTRAVENOUS at 15:41

## 2020-12-18 ASSESSMENT — PAIN SCALES - GENERAL: PAINLEVEL_OUTOF10: 3

## 2020-12-18 ASSESSMENT — ENCOUNTER SYMPTOMS
SORE THROAT: 0
VOMITING: 0
EYE REDNESS: 0
NAUSEA: 0
ABDOMINAL PAIN: 0
BACK PAIN: 0
CHOKING: 0
FACIAL SWELLING: 0
EYE DISCHARGE: 0
SHORTNESS OF BREATH: 0
APNEA: 0

## 2020-12-18 ASSESSMENT — PAIN DESCRIPTION - DESCRIPTORS: DESCRIPTORS: ACHING

## 2020-12-18 ASSESSMENT — PAIN DESCRIPTION - PAIN TYPE: TYPE: ACUTE PAIN

## 2020-12-18 ASSESSMENT — PAIN DESCRIPTION - LOCATION: LOCATION: TOE (COMMENT WHICH ONE)

## 2020-12-18 ASSESSMENT — PAIN DESCRIPTION - ORIENTATION: ORIENTATION: LEFT

## 2020-12-18 NOTE — ED PROVIDER NOTES
ED Attending Attestation Note     Date of evaluation: 12/18/2020    This patient was seen by the [facility] practice provider. I have seen and examined the patient, agree with the workup, evaluation, management and diagnosis. The care plan has been discussed. 79yo female who was recently discharged from here on Friday (admitted at that time secondary to palpitations then found to have left toe infection s/p amputation, was seen by vascular and podiatry at that time). Home nurse followed up today and they were concerned that toe was not healing well so she was sent here for further evaluation. Here she denies any pain though vitals are notable for tachycardia 120s. Medical history notable for  CKD, gout, HLD, HTN, toe amputation as noted above. She looked a little bit dehydrated on exam, given her tachycardia we were also concerned for potential PE with her history of recent Covid. Her foot exam here does not seem remarkably different from most recent images that we could see and does not look acutely infected although does appear to be healing slowly. CTPA was ordered and showed no signs of a PE however did show signs of new lymph nodes concerning for cancer. Discussed results with the patient and this was a new diagnosis for her. MYESHA did call and speak with her primary care physician and they asked us to refer her to oncology as well as GI and plan to follow-up with her closely. Patient is agreeable to this plan. Her work-up here otherwise was unremarkable and her vital signs improved. She will be discharged in stable condition. EKG Indication: tachycardia  EKG Interpretation: Sinus tachycardia, rate 129. PACs noted. IN/QRS/QTc within normal limits. Comparison to prior EKG from December 2, 2020 shows no acute ischemic changes.     INITIAL VITALS: BP: (!) 149/83, Temp: 99.2 °F (37.3 °C), Pulse: 125, Resp: 17, SpO2: 91 %   RECENT VITALS:  BP: (!) 158/76,Temp: 99.2 °F (37.3 °C), Pulse: 96,

## 2020-12-18 NOTE — ED PROVIDER NOTES
**ADVANCED PRACTICE PROVIDER, I HAVE EVALUATED THIS PATIENT**        [address]      Pt Name: Danny Ornelas  OGW:[id number]  [place] 7/4/1933  Date of evaluation: 12/18/2020  Provider: Coralee Gaucher, PA-C      Chief Complaint:    Chief Complaint   Patient presents with    Toe Pain     left big toe currently being tx , toe is black. home health nurse sent patient in        Nursing Notes, Past Medical Hx, Past Surgical Hx, Social Hx, Allergies, and Family Hx were all reviewed and agreed with or any disagreements were addressed in the HPI.    HPI:  (Location, Duration, Timing, Severity, Quality, Assoc Sx, Context, Modifying factors)  This is a  80 y.o. female complaint of here to have her toe looked at. She states that she had an amputation of her left second toe. This was done by Dr. Xochitl Harley. She just got released from the hospital on Friday. And her home nurse came by looked at it and was not sure if it was healing okay. Patient denies pain, no fever, no cough, states she did have Covid couple weeks ago. She denies chest pain, no abdominal pain, no weaknesses. She denies any injury. No other complaints. PastMedical/Surgical History:      Diagnosis Date    Chronic kidney disease     Gout     Hyperlipidemia     Hypertension          Procedure Laterality Date    APPENDECTOMY      EYE SURGERY  2004    XRT plaque OD for melanoma    FOOT SURGERY      left       Medications:  Discharge Medication List as of 12/18/2020  6:36 PM      CONTINUE these medications which have NOT CHANGED    Details   HYDROcodone-acetaminophen (NORCO) 5-325 MG per tablet Take 1 tablet by mouth every 8 hours as needed for Pain. Historical Med      ASPIRIN LOW DOSE 81 MG EC tablet Take 81 mg by mouth daily, DAWHistorical Med      clopidogrel (PLAVIX) 75 MG tablet Take 75 mg by mouth dailyHistorical Med      colchicine (COLCRYS) 0.6 MG tablet Take 0.6 mg by mouth dailyHistorical Med      gabapentin (NEURONTIN) 100 MG capsule Take 100 mg by mouth 2 times daily. Historical Med      atorvastatin (LIPITOR) 40 MG tablet Take 40 mg by mouth dailyHistorical Med      predniSONE (DELTASONE) 10 MG tablet Take 10 mg by mouth dailyHistorical Med      vitamin D (CHOLECALCIFEROL) 1000 UNIT TABS tablet Take 1,000 Units by mouth daily      latanoprost (XALATAN) 0.005 % ophthalmic solution Place 1 drop into the left eye nightly      brimonidine (ALPHAGAN) 0.2 % ophthalmic solution Place 1 drop into the left eye 3 times daily      acetaminophen (TYLENOL) 325 MG tablet Take 650 mg by mouth every 6 hours as needed. Review of Systems:  Review of Systems   Constitutional: Negative for chills and fever. HENT: Negative for congestion, facial swelling and sore throat. Eyes: Negative for discharge and redness. Respiratory: Negative for apnea, choking and shortness of breath. Cardiovascular: Negative for chest pain. Gastrointestinal: Negative for abdominal pain, nausea and vomiting. Genitourinary: Negative for dysuria. Musculoskeletal: Negative for back pain, neck pain and neck stiffness. Neurological: Negative for dizziness, tremors, seizures, weakness and headaches. All other systems reviewed and are negative. Positives and Pertinent negatives as per HPI. Except as noted above in the ROS, problem specific ROS was completed and is negative. Physical Exam:  Physical Exam  Vitals signs and nursing note reviewed. Constitutional:       Appearance: She is well-developed. She is not diaphoretic. HENT:      Head: Normocephalic and atraumatic. Nose: Nose normal.      Mouth/Throat:      Mouth: Mucous membranes are moist.      Pharynx: Oropharynx is clear. No oropharyngeal exudate or posterior oropharyngeal erythema. Eyes:      General:         Right eye: No discharge. Left eye: No discharge.    Neck:      Musculoskeletal: Normal range of motion and neck supple. Cardiovascular:      Rate and Rhythm: Normal rate and regular rhythm. Heart sounds: Normal heart sounds. No murmur. No friction rub. No gallop. Pulmonary:      Effort: Pulmonary effort is normal. No respiratory distress. Breath sounds: Normal breath sounds. No wheezing or rales. Chest:      Chest wall: No tenderness. Abdominal:      General: Abdomen is flat. Bowel sounds are normal. There is no distension. Palpations: Abdomen is soft. There is no mass. Tenderness: There is no abdominal tenderness. There is no guarding or rebound. Musculoskeletal: Normal range of motion. Left foot: Normal range of motion and normal capillary refill. No bony tenderness or swelling. Skin:     General: Skin is warm and dry. Neurological:      Mental Status: She is alert and oriented to person, place, and time.    Psychiatric:         Behavior: Behavior normal.         MEDICAL DECISION MAKING    Vitals:    Vitals:    12/18/20 1800 12/18/20 1815 12/18/20 1830 12/18/20 1845   BP: (!) 170/89  (!) 169/85 (!) 165/76   Pulse: 94 108 99 97   Resp:       Temp:       TempSrc:       SpO2:   99% 98%   Weight:           LABS:  Labs Reviewed   CBC WITH AUTO DIFFERENTIAL - Abnormal; Notable for the following components:       Result Value    RBC 3.97 (*)     Hemoglobin 11.1 (*)     Hematocrit 35.4 (*)     RDW 18.7 (*)     All other components within normal limits    Narrative:     Performed at:  [facility]  [address] [address] [facility] [address]   Phone [phone]   COMPREHENSIVE METABOLIC PANEL W/ REFLEX TO MG FOR LOW K - Abnormal; Notable for the following components:    Glucose 139 (*)     GFR Non- 59 (*)     Alb 3.2 (*)     Albumin/Globulin Ratio 1.0 (*)     All other components within normal limits    Narrative:     Performed at:  [facility]  [address] [facility] [address]   Phone [phone]   TROPONIN    Narrative:     added on to earlier labs  Performed at:  [facility]  [address] [facility] [address]   Phone [phone] of labs reviewed and werenegative at this time or not returned at the time of this note. RADIOLOGY:   Non-plain film images such as CT, Ultrasound and MRI are read by the radiologist. Mac Haque PA-C have directly visualized the radiologic plain film image(s) with the below findings:        Interpretation per the Radiologist below, if available at the time of this note:    CTA PULMONARY W CONTRAST   Final Result   No evidence of pulmonary embolism or aortic dissection. Redemonstration of patchy peripheral areas of consolidation, which given the   history of positive COVID-19, are compatible with COVID-19 pneumonia,   decreased when compared to the previous exam.      Redemonstration of a 1.4 cm pleural based nodule on the left. In addition,   there are soft tissue nodules seen within the upper abdomen measuring up to   1.7 cm. Findings are concerning for neoplasm. Further evaluation with   PET-CT and/or with histopathologic correlation is recommended. XR FOOT LEFT (MIN 3 VIEWS)   Final Result   Soft tissue swelling but no evidence of osteomyelitis or other acute osseous   findings. Xr Skull (min 4 Views)    Result Date: 12/2/2020  EXAMINATION: THREE XRAY VIEWS OF THE SKULL 12/2/2020 7:07 pm COMPARISON: None. HISTORY: ORDERING SYSTEM PROVIDED HISTORY: to clear patient for MRI TECHNOLOGIST PROVIDED HISTORY: Reason for exam:->to clear patient for MRI Reason for Exam: to clear patient for MRI , artificial right eye Acuity: Acute Type of Exam: Initial FINDINGS: No radiopaque foreign body is identified of the orbits. Patient is edentulous. There is no acute osseous abnormality. Multilevel spondylosis of the cervical spine, overall moderate in degree.      No metallic foreign bodies are identified. Xr Foot Left (min 3 Views)    Result Date: 12/18/2020  EXAMINATION: THREE XRAY VIEWS OF THE LEFT FOOT 12/18/2020 2:54 pm COMPARISON: November 30, 2020 HISTORY: ORDERING SYSTEM PROVIDED HISTORY: left big toe black TECHNOLOGIST PROVIDED HISTORY: Reason for exam:->left big toe black Reason for Exam: left big toe black Acuity: Chronic Type of Exam: Ongoing FINDINGS: No evidence of osteomyelitis. No fracture or dislocation. Soft tissue swelling again noted. Soft tissue swelling but no evidence of osteomyelitis or other acute osseous findings. Xr Foot Left (min 3 Views)    Result Date: 11/30/2020  EXAMINATION: THREE XRAY VIEWS OF THE LEFT FOOT 11/30/2020 9:48 am COMPARISON: 11/25/2020 HISTORY: ORDERING SYSTEM PROVIDED HISTORY: L foot wound, devascularized flap s/p 2nd toe amputation, c/f underlying infection TECHNOLOGIST PROVIDED HISTORY: Reason for exam:->L foot wound, devascularized flap s/p 2nd toe amputation, c/f underlying infection Reason for Exam: tachycardia, left foot anterior uler bewtween 1st & 2nd Acuity: Acute Type of Exam: Initial FINDINGS: Postoperative changes status post 2nd ray amputation at the metacarpal neck. Osteotomy site is grossly intact erosive changes. Background diffuse osseous demineralization, degenerative changes, hallux valgus with erosive changes at the 1st metatarsal head, similar to prior. Diffuse soft tissue swelling about forefoot. Diffuse soft tissue swelling predominating about the forefoot. Correlate clinically cellulitis. Discrete erosions the 2nd metatarsal osteotomy site there is persistent clinical concern for underlying osteomyelitis, MRI is more sensitive. Unchanged erosive findings at the 1st metatarsal head. Query underlying erosive or inflammatory arthropathy such as gout. Xr Foot Left (min 3 Views)    Result Date: 11/25/2020  EXAMINATION: THREE XRAY VIEWS OF THE LEFT FOOT 11/25/2020 1:57 pm COMPARISON: None.  HISTORY: ORDERING SYSTEM PROVIDED HISTORY: s/p toe amputation, swelling/warmth noted. darker skin around area. concern osteo TECHNOLOGIST PROVIDED HISTORY: Reason for exam:->s/p toe amputation, swelling/warmth noted. darker skin around area. concern osteo Reason for Exam: s/p toe amputation, swelling/warmth noted. darker skin around area. concern osteo Acuity: Chronic Type of Exam: Ongoing FINDINGS: Images of the graded by overlying dressings/cloth. Status post resection of the distal left 2nd metatarsal bone and the 2nd toe. Mild-to-moderate hallux valgus deformity. Osteoarthritis. No fracture or focal bone erosion/destruction evident. Osteoarthritis at the midfoot as well. Nonspecific soft tissue edema about the midfoot and forefoot predominates about the base of the hallux and remnant 2nd metatarsal bone. 1. Overlying artifacts degrades study quality. 2. Nonspecific soft tissue edema may be postsurgical or related to cellulitis, centered about the 2nd ray surgical site and hallux. 3. Osteoarthritis. 4. Hallux valgus deformity. 5. No definite acute osseous abnormality evident. Ct Chest Wo Contrast    Result Date: 11/25/2020  EXAMINATION: CT OF THE CHEST WITHOUT CONTRAST 11/25/2020 6:13 pm TECHNIQUE: CT of the chest was performed without the administration of intravenous contrast. Multiplanar reformatted images are provided for review. Dose modulation, iterative reconstruction, and/or weight based adjustment of the mA/kV was utilized to reduce the radiation dose to as low as reasonably achievable. COMPARISON: None. HISTORY: ORDERING SYSTEM PROVIDED HISTORY: SOB TECHNOLOGIST PROVIDED HISTORY: Reason for exam:-> SOB Reason for Exam: Shortness of Breath (for a couple days, COVID +, Sa O2 89-90 % on RA) Acuity: Acute Type of Exam: Initial FINDINGS: Mediastinum: Mild cardiomegaly. The great vessels appear unremarkable with exception of calcific atherosclerotic disease. No pericardial effusion.  Posterior mediastinal structures appear unremarkable. No mediastinal or hilar adenopathy. The ascending thoracic aorta is 28 mm and descending thoracic aorta 24 mm. The main pulmonary artery measures 23 mm diameter. Lungs/pleura: Small volume bilateral pleural effusion. Moderately severe pulmonary infiltrates bilateral lungs predominate mid and lower lungs bilateral, typical in appearance for COVID-19. Focal dense consolidation developing superior segment left lower lobe. Indeterminate solid soft tissue nodule has the medial left pleural space axial series 2, image 39 is 14 mm. No inspissated secretions or endobronchial lesion evident. Upper Abdomen: Soft Tissues/Bones: Right upper extremity PICC tip is at the distal superior vena cava level. 1. Moderately severe pulmonary involvement with infiltrates typical of COVID-19. 2. Small volume bilateral pleural effusion. 3. 14.0 mm solid pulmonary nodule left pleural region. Consider a non-contrast Chest CT at 3 months, a PET/CT, or tissue sampling. * 4. Calcific atherosclerosis aorta and coronary arteries. 5. Mild cardiomegaly. Peak ______________________________________________________________________________ ____ *These guidelines do not apply to immunocompromised patients and patients with cancer. Follow up in patients with significant comorbidities as clinically warranted. For lung cancer screening, adhere to Lung-RADS guidelines. Reference: Radiology. 2017; 284(1):228-43. Xr Chest Portable    Result Date: 11/30/2020  EXAMINATION: ONE XRAY VIEW OF THE CHEST 11/30/2020 10:48 am COMPARISON: 11/25/2020 HISTORY: ORDERING SYSTEM PROVIDED HISTORY: chest pain TECHNOLOGIST PROVIDED HISTORY: Reason for exam:->chest pain Reason for Exam: tachycardia, left foot anterior uler bewtween 1st & 2nd Acuity: Acute Type of Exam: Initial FINDINGS: Cardiac leads project over the chest.  Right upper extremity PICC line is seen.   Heterogeneous bilateral perihilar opacity as well as at the bases appears similar to prior. Artifacts are seen projecting at the apices. No definite pneumothorax. Cardiac and mediastinal silhouettes are similar to prior. Calcification of aorta. No significant interval change in perihilar and basilar opacity which can reflect edema or in the appropriate clinical setting, pneumonia. Chronic interstitial disease can appear similar. Xr Chest Portable    Result Date: 11/25/2020  EXAMINATION: ONE XRAY VIEW OF THE CHEST 11/25/2020 11:20 am COMPARISON: January 11, 2007 HISTORY: ORDERING SYSTEM PROVIDED HISTORY: SOB, PICC verification TECHNOLOGIST PROVIDED HISTORY: Reason for exam:->SOB, PICC verification Reason for Exam: SOB, PICC verification Acuity: Chronic Type of Exam: Ongoing FINDINGS: Diffuse reticular opacity throughout both lungs is present. Right-sided PICC line terminates in the lower SVC. No evidence of pneumothorax or pleural effusion. Bilateral pulmonary disease, new from 2007. This may be due to interstitial disease, either acute interstitial edema or chronic interstitial disease. Diffuse infection can also have this appearance. Mri Foot Left Wo Contrast    Result Date: 12/2/2020  EXAMINATION: MRI OF THE LEFT FOOT WITHOUT CONTRAST, 12/2/2020 7:20 pm TECHNIQUE: Multiplanar multisequence MRI of the left foot was performed without the administration of intravenous contrast. COMPARISON: Left foot radiographs 11/30/2020. HISTORY: ORDERING SYSTEM PROVIDED HISTORY: left foot osteo TECHNOLOGIST PROVIDED HISTORY: Reason for exam:->left foot osteo Reason for Exam: osteomyelitis  s/p 2nd toe amputation Acuity: Chronic Type of Exam: Initial FINDINGS: LISFRANC JOINT:  Lisfranc ligament is visualized and is normal.  The alignment of the tarsal-metatarsal joint is anatomic. BONE MARROW: Status post partial 2nd ray amputation at the level of the metatarsal neck. Mild patchy marrow edema in the 2nd metatarsal shaft.   No definite erosion at the amputation margin and no decreased T1 signal.  Patchy marrow edema seen throughout the remaining visualized osseous structures. No fracture or dislocation. No suspicious marrow space-occupying lesion. GREATER AND LESSER MTP JOINTS: Moderate 1st metatarsophalangeal degenerative changes. No joint effusion. The 3rd through 5th MTP joints are unremarkable. SOFT TISSUES: Mild dorsal forefoot subcutaneous edema. Mild subcutaneous edema in the surgical bed. No drainable fluid collection identified. No abnormal soft tissue mass. Moderate fatty atrophy of the forefoot musculature. TENDONS: No tenosynovitis. 1. Status post partial 2nd ray amputation without convincing evidence of osteomyelitis or other acute osseous abnormality. 2. Patchy diffuse marrow edema throughout the visualized osseous structures. This may be related to osteopenia. 3. Mild dorsal forefoot subcutaneous edema compatible cellulitis. No abscess. MEDICAL DECISION MAKING / ED COURSE:      PROCEDURES:   Procedures    None    Patient was given:  Medications   0.9 % sodium chloride bolus (0 mLs Intravenous Stopped 12/18/20 1840)   iopamidol (ISOVUE-370) 76 % injection 75 mL (75 mLs Intravenous Given 12/18/20 1541)     Emergency room course: Patient on exam throat is clear nonerythematous no exudate. Neck was supple full active motion without tenderness. No nuchal rigidity. Cardiovascular regular rhythm, lungs are clear. No wheeze rales or rhonchi noted. Chest wall show no tenderness with palpation. Abdomen is soft nontender. Normal bowel sounds all 4 quadrants. Left lower extremity hip knee was nontender calf show no tenderness. She does have wounds to her left great toe also has amputation of her second digit. She has black eschar tissue around the wound sutures are in place. There is no drainage. There is no surrounding erythema. Pedal pulse good at 2+ capillary refill less than 2 seconds all digits.   See photograph below. At this time I did have my attending Dr. Odalis Campos to get involved with this patient with me. Because she was slightly tachycardic I went ahead and did a CTA of her chest to make sure she did not have PE following her bout with the COVID-19. Lab results from today shows:  CBC with a white count 8.6, RBC 3.97, hemoglobin 11.1 and hematocrit 35.4. CMP shows sodium 145, potassium 4.3, chloride 108 BUN 11 creatinine 0.9. Normal transaminases. Troponin less than 0.01. X-ray of her left foot shows soft tissue swelling but no evidence of osteomyelitis or other acute osseous findings. CTA chest shows no evidence of pulmonary embolism or aortic dissection. Redemonstration of a patchy perihilar area of consolidation which given history of positive COVID-19 or compatible with. 1.7 cm finding or concern there is soft tissue nodule seen in the upper abdomen measuring 1.7 cm concern for possible neoplasm. Discussed patient CT findings with her as well as her x-ray for foot and lab results. She did tell me that she has had some weight loss. I told her I will place a call out to her primary care physician informed him. I would like for her to have a GI referral oncology referral.  I spoke with her physician who was on call for the group. She is aware. She was very thankful that we could notify them and she will make sure that her doctor follow-up with this. She will recommend that we give her GI referral as well as see oncology. Patient be referred to Dr. Flaco Melendez for gastroenterology and Dr. Robbie Griffin for oncology. Patient was okay with this plan as well. Continue her home medication. We did rewrap her foot. She will be discharged stable condition. The patient tolerated their visit well. I evaluated the patient. The physician was available for consultation as needed.   The patient and / or the family were informed of the results of any tests, a time was given to answer questions, a plan was proposed and they agreed with plan. CLINICAL IMPRESSION:  1. Abdominal mass, unspecified abdominal location    2. Encounter for post surgical wound check        DISPOSITION Decision To Discharge 12/18/2020 06:20:57 PM      PATIENT REFERRED TO:  Danny Luque MD  [address] [facility] [address]  [phone]    Call in 1 day      Evelyn Crandall MD  [address] [facility] [address]  [address]  [address] [place]    Call in 3 days      Radha Regalado MD  [address]  [phone]    Call in 3 days      [facility] [place] [address]  [phone]    Call in 1 day  As needed, If symptoms worsen      DISCHARGE MEDICATIONS:  Discharge Medication List as of 12/18/2020  6:36 PM          DISCONTINUED MEDICATIONS:  Discharge Medication List as of 12/18/2020  6:36 PM                 (Please note the MDM and HPI sections of this note were completed with a voice recognition program.  Efforts were made to edit the dictations but occasionally words are mis-transcribed.)    Electronically signed, Ely De Souza PA-C,          Ely De Souza PA-C  12/18/20 1350